# Patient Record
Sex: MALE | Race: WHITE | NOT HISPANIC OR LATINO | Employment: UNEMPLOYED | URBAN - METROPOLITAN AREA
[De-identification: names, ages, dates, MRNs, and addresses within clinical notes are randomized per-mention and may not be internally consistent; named-entity substitution may affect disease eponyms.]

---

## 2020-04-20 ENCOUNTER — HOSPITAL ENCOUNTER (OUTPATIENT)
Facility: MEDICAL CENTER | Age: 35
End: 2020-04-20
Payer: MEDICAID

## 2020-04-30 PROBLEM — I50.23 ACUTE ON CHRONIC SYSTOLIC CONGESTIVE HEART FAILURE (HCC): Status: ACTIVE | Noted: 2020-04-30

## 2020-04-30 PROBLEM — I26.99 PULMONARY EMBOLISM (HCC): Status: ACTIVE | Noted: 2020-04-30

## 2020-04-30 PROBLEM — I42.7 DRUG-INDUCED CARDIOMYOPATHY (HCC): Status: ACTIVE | Noted: 2020-04-30

## 2020-04-30 PROBLEM — F14.11 HISTORY OF COCAINE ABUSE (HCC): Status: ACTIVE | Noted: 2020-04-30

## 2020-05-13 ENCOUNTER — TELEPHONE (OUTPATIENT)
Dept: CARDIOLOGY | Facility: MEDICAL CENTER | Age: 35
End: 2020-05-13

## 2020-05-13 NOTE — TELEPHONE ENCOUNTER
Called patient to see if he has followed with a cardiologist in the past to get records prior to new patient appt with [facility]. Unable to reach patient, left voicemail for call back.

## 2020-05-28 PROBLEM — R60.9 EDEMA: Status: ACTIVE | Noted: 2020-05-28

## 2020-05-28 PROBLEM — Z09 HOSPITAL DISCHARGE FOLLOW-UP: Status: ACTIVE | Noted: 2020-05-28

## 2020-06-27 PROBLEM — I51.3 INTRACARDIAC THROMBUS: Status: ACTIVE | Noted: 2020-06-27

## 2020-06-27 PROBLEM — Z53.29 LEFT AGAINST MEDICAL ADVICE: Status: ACTIVE | Noted: 2020-06-27

## 2020-06-28 PROBLEM — J18.9 PNEUMONIA: Status: ACTIVE | Noted: 2020-06-28

## 2020-08-02 PROBLEM — K81.0 ACUTE CHOLECYSTITIS: Status: ACTIVE | Noted: 2020-08-02

## 2020-10-16 ENCOUNTER — TELEPHONE (OUTPATIENT)
Dept: CARDIOLOGY | Facility: MEDICAL CENTER | Age: 35
End: 2020-10-16

## 2020-10-19 PROBLEM — I50.9 CONGESTIVE HEART FAILURE (CHF) (HCC): Status: ACTIVE | Noted: 2020-10-19

## 2020-11-05 NOTE — PROGRESS NOTES
"Subjective:   Chief Complaint:   Chief Complaint   Patient presents with   • Edema         David Rod is a 35 y.o. male who returns for dilated CMO, BiV failure, NSTEMI, prior PE, possible LV thrombus, anticoagulation, HTN    Today, [facility] 4 CHF.    Has MI in Feb due to drug use, has dilated CMO, EF < 20%, biV failure  NSTEMI was not Type I MI.  Was told \"blood clot in my lung.\" Later coughing up blood.  On warfarin.  No LV thrombus on echo     E[place] 2020  Severely dilated LV <20%, RV moderately dilated, severe LAE, mild to mod TR, mod MR, RVSP 70, RAP 20, left pleural effusion, small dimitry effusion, flat septum.    Having [facility] 4 HFrEF, Stage C. Can only walk about 10 feet.  Weight at home was down to 220, now up to 260.  Has LE edema and belly edema.  Has orthopnea, sleeping in a chair.  Was told IV lasix was important.  Passive liver congestion.    Has atypical CP, happens at rest, a little shock, does not last long.  No significant dyspnea on exertion, orthopnea or lower extremity swelling.   No stroke/TIA like symptoms.    Gets heart racing about once a week when \"I over do it.\"  He gets lightheaded at times but no syncope.    Has fallen due to leg weakness but not syncope.    Has a venous clot removed from arm after some kind of line.  [facility] 10-4-2020 Occlusive thrombus within the right internal jugular vein, nonocclusive thrombus within the right median cubital vein.    Has a LE wound, on Abx. Will see wound care.    Has HTN, getting at home sometimes.    No prior HLP but no recent lipids    Father had MI/stents in his 50s,  at 72.  MGF  of chf in hs 80s.  No prior smoking history.  No history of diabetes.  No history of autoimmune disease such as lupus or rheumatoid arthritis.  No chronic kidney disease.  No ETOH overuse. Prior heavy etoh, sober 6 years.  No caffeine overuse. Soda.  No more recreation substance use.      Here with his mother, his support.    DATA REVIEWED by " me:  ECG (my personal interpretation) [id number]  Sinus, 91, LPFB, delayed R wave progression, T wave inversion    Echo [facility] [place] 9-  Severely dilated LV <20%, RV moderately dilated, severe LAE, mild to mod TR, mod MR, RVSP 70, RAP 20, left pleural effusion, small dimitry effusion, flat septum.    RUQ US 6-3-2020  Cholelithiasis is present.  The pancreas is not well seen.  The liver appears enlarged.  The portal vein is not reliably imaged.    US UE 10-4-2020  Occlusive thrombus within the right internal jugular vein  Nonocclusive thrombus within the right median cubital vein.    Most recent labs:       Lab Results   Component Value Date/Time    WBC 4.8 11/05/2020 03:48 PM    HEMOGLOBIN 13.7 (L) 11/05/2020 03:48 PM    HEMATOCRIT 45.0 11/05/2020 03:48 PM    MCV 93.0 11/05/2020 03:48 PM    INR 1.52 11/05/2020 03:48 PM      Lab Results   Component Value Date/Time    SODIUM 135 (L) 11/05/2020 03:48 PM    POTASSIUM 3.9 11/05/2020 03:48 PM    CHLORIDE 104 11/05/2020 03:48 PM    CO2 20 (L) 11/05/2020 03:48 PM    GLUCOSE 126 (H) 11/05/2020 03:48 PM    BUN 12 11/05/2020 03:48 PM    CREATININE 1.0 11/05/2020 03:48 PM      Lab Results   Component Value Date/Time    ASTSGOT 35 11/05/2020 03:48 PM    ALTSGPT 20 11/05/2020 03:48 PM    ALBUMIN 2.7 (L) 11/05/2020 03:48 PM      No results found for: CHOLSTRLTOT, LDL, HDL, TRIGLYCERIDE  No results for input(s): NTPROBNP, TROPONINT in the last 72 hours.      Past Medical History:   Diagnosis Date   • Cardiomegaly    • Congestive heart failure (HCC)    • Decreased cardiac ejection fraction     15-20%   • Ear pain    • Hypertension    • Insomnia    • Pulmonary embolism and infarction (HCC)    • Systolic heart failure (HCC)      History reviewed. No pertinent surgical history.  Family History   Problem Relation Age of Onset   • Heart Disease Father    • Heart Attack Father         Stents in 50s     Social History     Socioeconomic History   • Marital status: Single     Spouse  name: Not on file   • Number of children: Not on file   • Years of education: Not on file   • Highest education level: Not on file   Occupational History   • Not on file   Social Needs   • Financial resource strain: Not on file   • Food insecurity     Worry: Patient refused     Inability: Patient refused   • Transportation needs     Medical: Patient refused     Non-medical: Patient refused   Tobacco Use   • Smoking status: Never Smoker   • Smokeless tobacco: Never Used   Substance and Sexual Activity   • Alcohol use: Not Currently   • Drug use: Not Currently     Comment: Hx of daily cocaine use   • Sexual activity: Not Currently   Lifestyle   • Physical activity     Days per week: Not on file     Minutes per session: Not on file   • Stress: Not on file   Relationships   • Social connections     Talks on phone: Not on file     Gets together: Not on file     Attends Buddhism service: Not on file     Active member of club or organization: Not on file     Attends meetings of clubs or organizations: Not on file     Relationship status: Not on file   • Intimate partner violence     Fear of current or ex partner: Not on file     Emotionally abused: Not on file     Physically abused: Not on file     Forced sexual activity: Not on file   Other Topics Concern   • Not on file   Social History Narrative   • Not on file     No Known Allergies    Current Outpatient Medications   Medication Sig Dispense Refill   • carvedilol (COREG) 6.25 MG Tab Take 1 Tab by mouth 2 times a day, with meals. 200 Tab 3   • torsemide (DEMADEX) 100 MG Tab Take 1 Tab by mouth every day. 100 Tab 3   • torsemide (DEMADEX) 20 MG Tab Take 2 Tabs by mouth every day. 200 Tab 3   • metOLazone (ZAROXOLYN) 5 MG Tab Take 1 Tab by mouth every day. 100 Tab 3   • doxycycline (VIBRAMYCIN) 100 MG Tab Take 1 Tab by mouth 2 times a day for 7 days. 14 Tab 0   • acetaminophen (TYLENOL) 500 MG Tab Take 1,000 mg by mouth every 6 hours as needed.     • lisinopril  (PRINIVIL) 5 MG Tab Take 0.5 Tabs by mouth every day. 30 Tab 0   • pantoprazole (PROTONIX) 40 MG Tablet Delayed Response Take 1 Tab by mouth every day. 30 Tab 0   • spironolactone (ALDACTONE) 25 MG Tab Take 1 Tab by mouth 2 times a day. 30 Tab 0   • warfarin (COUMADIN) 5 MG Tab Take 10 mg by mouth every day. First dose yesterday     • ondansetron (ZOFRAN ODT) 4 MG TABLET DISPERSIBLE Take 1 Tab by mouth every 6 hours as needed for Nausea. 10 Tab 0   • hydrOXYzine HCl (ATARAX) 25 MG Tab Take 25 mg by mouth 3 times a day as needed for Itching.     • docusate sodium 100 MG Cap Take 100 mg by mouth 2 Times a Day. 60 Cap 0   • aspirin EC (ECOTRIN) 81 MG Tablet Delayed Response Take 81 mg by mouth every day.       No current facility-administered medications for this visit.        ROS  All others systems reviewed and negative.     Objective:     /98 (BP Location: Right arm, Patient Position: Sitting)   Pulse (!) 106   Ht 1.829 m (6')   Wt 118.4 kg (261 lb)   SpO2 94%  Body mass index is 35.4 kg/m².    General: No acute distress. Well nourished.  HEENT: EOM grossly intact, no scleral icterus, no pharyngeal erythema.   Neck:  + JVD to mandible at 90, no bruits, trachea midline  CVS: RRR. Normal S1, loud S2. No M/R/G. 4+ LE edema.  2+ radial pulses  Resp: Mild increase WOB, bibasilar rales  Abdomen: Soft, NT, no karrie hepatomegaly, possible ascites.  MSK/Ext: No clubbing or cyanosis.  Skin: Warm and dry, wound RLE with puss  Neurological: CN III-XII grossly intact. No focal deficits. Generally weak  Psych: A&O x 3, appropriate affect, good judgement        Assessment:     1. Drug-induced cardiomyopathy (HCC)  EC-ECHOCARDIOGRAM COMPLETE W/O CONT   2. Chronic systolic congestive heart failure (HCC)     3. Pulmonary embolism, other, unspecified chronicity, unspecified whether acute cor pulmonale present (HCC)     4. History of cocaine abuse (HCC)     5. Intracardiac thrombus     6. Hx of non-ST elevation myocardial  infarction (NSTEMI)     7. Acute systolic congestive heart failure (HCC)  Basic Metabolic Panel    Basic Metabolic Panel    Basic Metabolic Panel   8. Family history of coronary artery disease in father     9. Other chest pain     10. Palpitations     11. Other secondary pulmonary hypertension (HCC)     12. Tricuspid valve regurgitation, nonrheumatic     13. Non-rheumatic mitral regurgitation     14. Pericardial effusion         Medical Decision Making:  Today's Assessment / Status / Plan:     -Needs serious med changes and aggressive fluid reduction with close monitoring  -needs eventual FU echo, I would say 3 months  -Then consider ICD  -Has been sober  -Atypical CP, not concerned  -Stay on warfarin  -change BB  -May need lasix, will FU BPM 3 days, 1 week, every week thereafter  -Lipids later  -RTC 1-2 weeks, looking for spot      Written instructions given today:    -Stop metoprolol, start carvedilol 6.25 mg AM and PM. I am not worried about BP being too low unless it is under 90 on the stop number OR you feel like you are so lightheaded you are going to pass out.    -Stop Lasix=furosemide, start torsemide 100 mg morning and 40 mg in the afternoon    -Start taking metolazone 5 mg once daily until your weight is down to 240 pounds, then go to every 3rd day    -non fasting blood work in 3 days, then 1 week, then every one week until your weight is down to about 230    -Use itzatt to report concerns about weight loss or new symptoms, watch for lightheadedness    -You should always hear results of testing within 5 days with my interpretation, if you do not, send a Inform Direct message or call the office: [phone].        Return in about 2 weeks (around 11/20/2020).    It is my pleasure to participate in the care of Mr. Rod.  Please do not hesitate to contact me with questions or concerns.    Karli Grace MD, [facility]  Cardiologist [facility]    Please note that this dictation  was created using voice recognition software. I have made every reasonable attempt to correct obvious errors, but it is possible there are errors of grammar and possibly content that I did not discover before finalizing the note.

## 2020-11-06 ENCOUNTER — OFFICE VISIT (OUTPATIENT)
Dept: CARDIOLOGY | Facility: CLINIC | Age: 35
End: 2020-11-06
Payer: MEDICAID

## 2020-11-06 ENCOUNTER — TELEPHONE (OUTPATIENT)
Dept: CARDIOLOGY | Facility: MEDICAL CENTER | Age: 35
End: 2020-11-06

## 2020-11-06 VITALS
SYSTOLIC BLOOD PRESSURE: 116 MMHG | BODY MASS INDEX: 35.35 KG/M2 | DIASTOLIC BLOOD PRESSURE: 98 MMHG | OXYGEN SATURATION: 94 % | WEIGHT: 261 LBS | HEART RATE: 106 BPM | HEIGHT: 72 IN

## 2020-11-06 DIAGNOSIS — I36.1 TRICUSPID VALVE REGURGITATION, NONRHEUMATIC: ICD-10-CM

## 2020-11-06 DIAGNOSIS — I25.2 HX OF NON-ST ELEVATION MYOCARDIAL INFARCTION (NSTEMI): ICD-10-CM

## 2020-11-06 DIAGNOSIS — I27.29 OTHER SECONDARY PULMONARY HYPERTENSION (HCC): ICD-10-CM

## 2020-11-06 DIAGNOSIS — I34.0 NON-RHEUMATIC MITRAL REGURGITATION: ICD-10-CM

## 2020-11-06 DIAGNOSIS — I51.3 INTRACARDIAC THROMBUS: ICD-10-CM

## 2020-11-06 DIAGNOSIS — R00.2 PALPITATIONS: ICD-10-CM

## 2020-11-06 DIAGNOSIS — Z82.49 FAMILY HISTORY OF CORONARY ARTERY DISEASE IN FATHER: ICD-10-CM

## 2020-11-06 DIAGNOSIS — I26.99 PULMONARY EMBOLISM, OTHER, UNSPECIFIED CHRONICITY, UNSPECIFIED WHETHER ACUTE COR PULMONALE PRESENT (HCC): ICD-10-CM

## 2020-11-06 DIAGNOSIS — I50.22 CHRONIC SYSTOLIC CONGESTIVE HEART FAILURE (HCC): ICD-10-CM

## 2020-11-06 DIAGNOSIS — I42.7 DRUG-INDUCED CARDIOMYOPATHY (HCC): ICD-10-CM

## 2020-11-06 DIAGNOSIS — R07.89 OTHER CHEST PAIN: ICD-10-CM

## 2020-11-06 DIAGNOSIS — I50.21 ACUTE SYSTOLIC CONGESTIVE HEART FAILURE (HCC): ICD-10-CM

## 2020-11-06 DIAGNOSIS — F14.11 HISTORY OF COCAINE ABUSE (HCC): ICD-10-CM

## 2020-11-06 DIAGNOSIS — I31.39 PERICARDIAL EFFUSION: ICD-10-CM

## 2020-11-06 PROCEDURE — 99205 OFFICE O/P NEW HI 60 MIN: CPT | Performed by: INTERNAL MEDICINE

## 2020-11-06 RX ORDER — TORSEMIDE 20 MG/1
40 TABLET ORAL DAILY
Qty: 200 TAB | Refills: 3 | Status: SHIPPED | OUTPATIENT
Start: 2020-11-06 | End: 2020-11-20 | Stop reason: SDUPTHER

## 2020-11-06 RX ORDER — TORSEMIDE 100 MG/1
100 TABLET ORAL DAILY
Qty: 100 TAB | Refills: 3 | Status: SHIPPED | OUTPATIENT
Start: 2020-11-06 | End: 2020-11-20

## 2020-11-06 RX ORDER — TORSEMIDE 20 MG/1
40 TABLET ORAL DAILY
Qty: 200 TAB | Refills: 3 | Status: SHIPPED | OUTPATIENT
Start: 2020-11-06 | End: 2020-11-06 | Stop reason: SDUPTHER

## 2020-11-06 RX ORDER — CARVEDILOL 6.25 MG/1
6.25 TABLET ORAL 2 TIMES DAILY WITH MEALS
Qty: 200 TAB | Refills: 3 | Status: SHIPPED | OUTPATIENT
Start: 2020-11-06 | End: 2020-11-20

## 2020-11-06 RX ORDER — METOLAZONE 5 MG/1
5 TABLET ORAL DAILY
Qty: 100 TAB | Refills: 3 | Status: SHIPPED | OUTPATIENT
Start: 2020-11-06 | End: 2020-11-20 | Stop reason: SDUPTHER

## 2020-11-06 ASSESSMENT — FIBROSIS 4 INDEX: FIB4 SCORE: 1.4

## 2020-11-06 NOTE — TELEPHONE ENCOUNTER
Carla Wiseman, Med Ass't  Petra Fox R.N.          Previous Messages    ----- Message -----   From: Carla Borden, Med Ass't   Sent: 11/6/2020  11:22 AM PST   To: Zarina Tenorio   Subject: Rx sent to different pharm                       LS    Pt calling in regards to pharmacy that Rx torsemide (DEMADEX) 20 MG Tab, was sent to [facility] in [place], does NOT have the 20mg tabs available. Pt was wondering if we can send a new Rx of the torsemide 20mg tabs over to the [facility] pharmacy in [place].   Please call to confirm with pt at  [phone].     Thank you.        Rx sent to [facility] per pt request.

## 2020-11-06 NOTE — PATIENT INSTRUCTIONS
-Stop metoprolol, start carvedilol 6.25 mg AM and PM. I am not worried about BP being too low unless it is under 90 on the stop number OR you feel like you are so lightheaded you are going to pass out.    -Stop Lasix=furosemide, start torsemide 100 mg morning and 40 mg in the afternoon    -Start taking metolazone 5 mg once daily until your weight is down to 240 pounds, then go to every 3rd day    -non fasting blood work in 3 days, then 1 week, then every one week until your weight is down to about 230    -Use Cobrain to report concerns about weight loss or new symptoms, watch for lightheadedness    -You should always hear results of testing within 5 days with my interpretation, if you do not, send a Cobrain message or call the office: [phone].

## 2020-11-15 ENCOUNTER — PATIENT MESSAGE (OUTPATIENT)
Dept: CARDIOLOGY | Facility: CLINIC | Age: 35
End: 2020-11-15

## 2020-11-18 ENCOUNTER — TELEPHONE (OUTPATIENT)
Dept: VASCULAR LAB | Facility: MEDICAL CENTER | Age: 35
End: 2020-11-18

## 2020-11-18 DIAGNOSIS — Z79.01 ANTICOAGULATED: ICD-10-CM

## 2020-11-18 DIAGNOSIS — I51.3 INTRACARDIAC THROMBUS: ICD-10-CM

## 2020-11-18 PROBLEM — L98.9 SKIN LESION: Status: ACTIVE | Noted: 2020-11-18

## 2020-11-18 PROBLEM — I25.2 H/O NON-ST ELEVATION MYOCARDIAL INFARCTION (NSTEMI): Status: ACTIVE | Noted: 2020-11-18

## 2020-11-18 PROBLEM — R11.0 NAUSEA: Status: ACTIVE | Noted: 2020-11-18

## 2020-11-18 NOTE — PROGRESS NOTES
"Subjective:   Chief Complaint:   Chief Complaint   Patient presents with   • Pulmonary Embolism     F/V DX:EDEMA   • MI (Non ST Segment Elevation MI)   • Congestive Heart Failure       David Rod is a 35 y.o. male who returns for dilated CMO, BiV failure, NSTEMI, prior PE, possible LV thrombus, anticoagulation, HTN    My first visit with him early 2020, he was in [facility] 4 CHF.  We changed meds, he lost 40-45 pounds in 2 weeks.  Weight was as high as 260, was previously 220 leaving the hospital, now 215.  LE edema much better.  Was in a W/C, no longer  Now [facility] 2-3, can go around the block.    He starting to ride stationary bike now.    Has MI in Feb due to drug use, has dilated CMO, EF < 20%, biV failure  NSTEMI was not Type I MI.  Was told \"blood clot in my lung.\" Later coughing up blood.  On warfarin.  No LV thrombus on echo     [place] [place] 2020  Severely dilated LV <20%, RV moderately dilated, severe LAE, mild to mod TR, mod MR, RVSP 70, RAP 20, left pleural effusion, small dimitry effusion, flat septum.    Passive liver congestion.    Has atypical CP, happens at rest, a little shock, does not last long.  No stroke/TIA like symptoms.    Gets heart racing about once a week when \"I over do it.\" Not as bad after water weight is down.  He gets lightheaded at times but no syncope.    Has fallen due to leg weakness but not syncope.    Has a venous clot removed from arm after some kind of line.  [facility] thrombus within the right internal jugular vein, nonocclusive thrombus within the right median cubital vein.    Has a LE wound, seeing wound clinic.    Has HTN, getting at home sometimes.    No prior HLP but no recent lipids.    Father had MI/stents in his 50s,  at 72.  MGF  of chf in hs 80s.  No prior smoking history.  No history of diabetes.  No history of autoimmune disease such as lupus or rheumatoid arthritis.  No chronic kidney disease.  No ETOH overuse. Prior heavy " etoh, sober 6 years.  No caffeine overuse. Soda.  No more recreation substance use.      Here with his mother, his support.    DATA REVIEWED by me:  ECG (my personal interpretation) [id number]  Sinus, 91, LPFB, delayed R wave progression, T wave inversion    Echo [facility] [place] 9-  Severely dilated LV <20%, RV moderately dilated, severe LAE, mild to mod TR, mod MR, RVSP 70, RAP 20, left pleural effusion, small dimitry effusion, flat septum.    [facility] US 6-3-2020  Cholelithiasis is present.  The pancreas is not well seen.  The liver appears enlarged.  The portal vein is not reliably imaged.    US UE 10-4-2020  Occlusive thrombus within the right internal jugular vein  Nonocclusive thrombus within the right median cubital vein.    Most recent labs:       Lab Results   Component Value Date/Time    WBC 4.8 11/05/2020 03:48 PM    HEMOGLOBIN 13.7 (L) 11/05/2020 03:48 PM    HEMATOCRIT 45.0 11/05/2020 03:48 PM    MCV 93.0 11/05/2020 03:48 PM    INR 1.52 11/05/2020 03:48 PM      Lab Results   Component Value Date/Time    SODIUM 130 (L) 11/20/2020 10:35 AM    POTASSIUM 3.4 (L) 11/20/2020 10:35 AM    CHLORIDE 93 (L) 11/20/2020 10:35 AM    CO2 26 11/20/2020 10:35 AM    GLUCOSE 129 (H) 11/20/2020 10:35 AM    BUN 41 (H) 11/20/2020 10:35 AM    CREATININE 1.0 11/20/2020 10:35 AM      Lab Results   Component Value Date/Time    ASTSGOT 35 11/05/2020 03:48 PM    ALTSGPT 20 11/05/2020 03:48 PM    ALBUMIN 2.7 (L) 11/05/2020 03:48 PM      No results found for: CHOLSTRLTOT, LDL, HDL, TRIGLYCERIDE  No results for input(s): NTPROBNP, TROPONINT in the last 72 hours.      Past Medical History:   Diagnosis Date   • Cardiomegaly    • Congestive heart failure (HCC)    • Decreased cardiac ejection fraction     15-20%   • Ear pain    • Hypertension    • Insomnia    • Pulmonary embolism and infarction (HCC)    • Systolic heart failure (HCC)      History reviewed. No pertinent surgical history.  Family History   Problem Relation Age of Onset   •  Heart Disease Father    • Heart Attack Father         Stents in 50s     Social History     Socioeconomic History   • Marital status: Single     Spouse name: Not on file   • Number of children: Not on file   • Years of education: Not on file   • Highest education level: Not on file   Occupational History   • Not on file   Social Needs   • Financial resource strain: Not on file   • Food insecurity     Worry: Patient refused     Inability: Patient refused   • Transportation needs     Medical: Patient refused     Non-medical: Patient refused   Tobacco Use   • Smoking status: Never Smoker   • Smokeless tobacco: Never Used   Substance and Sexual Activity   • Alcohol use: Not Currently   • Drug use: Not Currently     Comment: Hx of daily cocaine use   • Sexual activity: Not Currently   Lifestyle   • Physical activity     Days per week: Not on file     Minutes per session: Not on file   • Stress: Not on file   Relationships   • Social connections     Talks on phone: Not on file     Gets together: Not on file     Attends Oriental orthodox service: Not on file     Active member of club or organization: Not on file     Attends meetings of clubs or organizations: Not on file     Relationship status: Not on file   • Intimate partner violence     Fear of current or ex partner: Not on file     Emotionally abused: Not on file     Physically abused: Not on file     Forced sexual activity: Not on file   Other Topics Concern   • Not on file   Social History Narrative   • Not on file     No Known Allergies    Current Outpatient Medications   Medication Sig Dispense Refill   • torsemide (DEMADEX) 20 MG Tab Take 2 Tabs by mouth every day. 200 Tab 3   • spironolactone (ALDACTONE) 25 MG Tab Take 1 Tab by mouth every day. 100 Tab 3   • carvedilol (COREG) 12.5 MG Tab Take 1 Tab by mouth 2 times a day, with meals. 200 Tab 3   • lisinopril (PRINIVIL) 2.5 MG Tab Take 1 Tab by mouth every day. 100 Tab 3   • metOLazone (ZAROXOLYN) 5 MG Tab Take 1 Tab by  mouth 1 time daily as needed.     • warfarin (COUMADIN) 5 MG Tab Take 10 mg by mouth every day.     • ondansetron (ZOFRAN ODT) 4 MG TABLET DISPERSIBLE Take 1 Tab by mouth every 6 hours as needed for Nausea. 20 Tab 5   • acetaminophen (TYLENOL) 500 MG Tab Take 1,000 mg by mouth every 6 hours as needed.     • pantoprazole (PROTONIX) 40 MG Tablet Delayed Response Take 1 Tab by mouth every day. 30 Tab 0   • docusate sodium 100 MG Cap Take 100 mg by mouth 2 Times a Day. 60 Cap 0     No current facility-administered medications for this visit.        ROS    All others systems reviewed and negative.     Objective:     /72 (BP Location: Left arm, Patient Position: Sitting, BP Cuff Size: Adult)   Pulse 98   Ht 1.829 m (6')   Wt 95.7 kg (211 lb)   SpO2 93%  Body mass index is 28.62 kg/m².    General: No acute distress. Well nourished.  HEENT: EOM grossly intact, no scleral icterus, no pharyngeal erythema.   Neck:  No JVD, no bruits, trachea midline  CVS: RRR. Normal S1, S2. No M/R/G. No LE edema.  2+ radial pulses, 2+ PT pulses  Resp: CTAB. No wheezing or crackles/rhonchi. Normal respiratory effort.  Abdomen: Soft, NT, no karrie hepatomegaly.  MSK/Ext: No clubbing or cyanosis.  Skin: Warm and dry, bandage RLE  Neurological: CN III-XII grossly intact. No focal deficits.   Psych: A&O x 3, appropriate affect, good judgement      Assessment:     1. Drug-induced cardiomyopathy (HCC)  EC-ECHOCARDIOGRAM COMPLETE W/O CONT   2. Acute systolic congestive heart failure (HCC)  Comp Metabolic Panel   3. Family history of coronary artery disease in father     4. Other chest pain     5. Pulmonary embolism, other, unspecified chronicity, unspecified whether acute cor pulmonale present (HCC)     6. Palpitations     7. Intracardiac thrombus     8. Other secondary pulmonary hypertension (HCC)     9. Hx of non-ST elevation myocardial infarction (NSTEMI)  Lipid Profile   10. Tricuspid valve regurgitation, nonrheumatic     11.  Non-rheumatic mitral regurgitation     12. Pericardial effusion         Medical Decision Making:  Today's Assessment / Status / Plan:     -Dry weight appears to be about 215, see below  -needs eventual FU echo, I would say 3 months, ordered today for Feb 2020  -Then consider ICD  -Has been sober, good job  -Atypical CP, not concerned  -Rare palpitations, better.  -Stay on warfarin  -adjust CHF meds, see below  -Lipids and CMP  -He starting to ride stationary bike now  -RTC 3-4 weeks      Written instructions given today:      -Cut your lisinopril from 5 to 2.5 mg daily    -Increase your carvedilol from 6.25 mg AM and PM to 12.5 mg AM and PM (take 2 pills twice a day until gone)    -Reduce spironolactone from 25 mg AM and PM to just once daily    -Reduce your torsemide from 100 mg every other daily to 40 mg once daily, hand on to your old 100 mg, don't throw out    -Do not use zaroxlyn unless we instruct you to do so, hold onto it.    -If your weight goes over 218 pounds, increase your torsemide from 40 mg to 60 mg until it is back down to 215.      -You might determine that you need torsemide 60 mg every day to maintain weight at 215 pounds, if that is the case, let us know.      Return in about 4 weeks (around 12/18/2020).    It is my pleasure to participate in the care of Mr. Rod.  Please do not hesitate to contact me with questions or concerns.    Karli Grace MD, [facility]  Cardiologist [facility]    Please note that this dictation was created using voice recognition software. I have made every reasonable attempt to correct obvious errors, but it is possible there are errors of grammar and possibly content that I did not discover before finalizing the note.

## 2020-11-18 NOTE — TELEPHONE ENCOUNTER
Left voicemail message for patient to return call to [facility] to establish care      cristiana Adams, Clinical Pharmacist, CDE, CACP

## 2020-11-19 ENCOUNTER — TELEPHONE (OUTPATIENT)
Dept: VASCULAR LAB | Facility: MEDICAL CENTER | Age: 35
End: 2020-11-19

## 2020-11-19 NOTE — TELEPHONE ENCOUNTER
Left VM for pt regarding referral to anticoagulation from Dr. Grace for warfarin. Asked pt to please call back to establish care.     Insurance: medicaid  PCP: non- [facility]   Location: [address]       Dena A Gurries, PharmD

## 2020-11-20 ENCOUNTER — OFFICE VISIT (OUTPATIENT)
Dept: CARDIOLOGY | Facility: CLINIC | Age: 35
End: 2020-11-20
Payer: MEDICAID

## 2020-11-20 VITALS
OXYGEN SATURATION: 93 % | SYSTOLIC BLOOD PRESSURE: 114 MMHG | WEIGHT: 211 LBS | HEIGHT: 72 IN | BODY MASS INDEX: 28.58 KG/M2 | DIASTOLIC BLOOD PRESSURE: 72 MMHG | HEART RATE: 98 BPM

## 2020-11-20 DIAGNOSIS — I26.99 PULMONARY EMBOLISM, OTHER, UNSPECIFIED CHRONICITY, UNSPECIFIED WHETHER ACUTE COR PULMONALE PRESENT (HCC): ICD-10-CM

## 2020-11-20 DIAGNOSIS — I51.3 INTRACARDIAC THROMBUS: ICD-10-CM

## 2020-11-20 DIAGNOSIS — R00.2 PALPITATIONS: ICD-10-CM

## 2020-11-20 DIAGNOSIS — I34.0 NON-RHEUMATIC MITRAL REGURGITATION: ICD-10-CM

## 2020-11-20 DIAGNOSIS — I25.2 HX OF NON-ST ELEVATION MYOCARDIAL INFARCTION (NSTEMI): ICD-10-CM

## 2020-11-20 DIAGNOSIS — Z82.49 FAMILY HISTORY OF CORONARY ARTERY DISEASE IN FATHER: ICD-10-CM

## 2020-11-20 DIAGNOSIS — I31.39 PERICARDIAL EFFUSION: ICD-10-CM

## 2020-11-20 DIAGNOSIS — I50.21 ACUTE SYSTOLIC CONGESTIVE HEART FAILURE (HCC): ICD-10-CM

## 2020-11-20 DIAGNOSIS — I42.7 DRUG-INDUCED CARDIOMYOPATHY (HCC): ICD-10-CM

## 2020-11-20 DIAGNOSIS — R07.89 OTHER CHEST PAIN: ICD-10-CM

## 2020-11-20 DIAGNOSIS — I36.1 TRICUSPID VALVE REGURGITATION, NONRHEUMATIC: ICD-10-CM

## 2020-11-20 DIAGNOSIS — I27.29 OTHER SECONDARY PULMONARY HYPERTENSION (HCC): ICD-10-CM

## 2020-11-20 PROCEDURE — 99214 OFFICE O/P EST MOD 30 MIN: CPT | Performed by: INTERNAL MEDICINE

## 2020-11-20 RX ORDER — TORSEMIDE 20 MG/1
40 TABLET ORAL DAILY
Qty: 200 TAB | Refills: 3 | Status: SHIPPED | OUTPATIENT
Start: 2020-11-20 | End: 2021-11-01

## 2020-11-20 RX ORDER — CARVEDILOL 12.5 MG/1
12.5 TABLET ORAL 2 TIMES DAILY WITH MEALS
Qty: 200 TAB | Refills: 3 | Status: SHIPPED | OUTPATIENT
Start: 2020-11-20 | End: 2021-09-22

## 2020-11-20 RX ORDER — METOLAZONE 5 MG/1
5 TABLET ORAL
COMMUNITY
Start: 2020-11-20 | End: 2021-09-22

## 2020-11-20 RX ORDER — FUROSEMIDE 10 MG/ML
INJECTION INTRAMUSCULAR; INTRAVENOUS
COMMUNITY
Start: 2020-09-12 | End: 2020-11-20

## 2020-11-20 RX ORDER — SPIRONOLACTONE 25 MG/1
25 TABLET ORAL DAILY
Qty: 100 TAB | Refills: 3 | Status: SHIPPED | OUTPATIENT
Start: 2020-11-20 | End: 2021-09-22

## 2020-11-20 RX ORDER — LISINOPRIL 2.5 MG/1
2.5 TABLET ORAL DAILY
Qty: 100 TAB | Refills: 3 | Status: SHIPPED | OUTPATIENT
Start: 2020-11-20 | End: 2021-09-22

## 2020-11-20 ASSESSMENT — FIBROSIS 4 INDEX: FIB4 SCORE: 1.4

## 2020-11-20 NOTE — LETTER
"     [facility] [address],   [address]  [place] [address]  Phone: [phone]  Fax: [phone]              David Rod  1985    Encounter Date: 11/20/2020    Karli Grace M.D.          PROGRESS NOTE:  Subjective:   Chief Complaint:   Chief Complaint   Patient presents with   • Pulmonary Embolism     F/V DX:EDEMA   • MI (Non ST Segment Elevation MI)   • Congestive Heart Failure       David Rod is a 35 y.o. male who returns for dilated CMO, BiV failure, NSTEMI, prior PE, possible LV thrombus, anticoagulation, HTN    My first visit with him early Nov 2020, he was in [facility] 4 CHF.  We changed meds, he lost 40-45 pounds in 2 weeks.  Weight was as high as 260, was previously 220 leaving the hospital, now 215.  LE edema much better.  Was in a W/C, no longer  Now [facility] 2-3, can go around the block.    He starting to ride stationary bike now.    Has MI in Feb due to drug use, has dilated CMO, EF < 20%, biV failure  NSTEMI was not Type I MI.  Was told \"blood clot in my lung.\" Later coughing up blood.  On warfarin.  No LV thrombus on echo 9-2020    Echo [facility] 9-  Severely dilated LV <20%, RV moderately dilated, severe LAE, mild to mod TR, mod MR, RVSP 70, RAP 20, left pleural effusion, small dimitry effusion, flat septum.    Passive liver congestion.    Has atypical CP, happens at rest, a little shock, does not last long.  No stroke/TIA like symptoms.    Gets heart racing about once a week when \"I over do it.\" Not as bad after water weight is down.  He gets lightheaded at times but no syncope.    Has fallen due to leg weakness but not syncope.    Has a venous clot removed from arm after some kind of line.  [facility] 10-4-2020 Occlusive thrombus within the right internal jugular vein, nonocclusive thrombus within the right median cubital vein.    Has a LE wound, seeing wound clinic.    Has HTN, getting at home sometimes.    No " prior HLP but no recent lipids.    Father had MI/stents in his 50s,  at 72.  MGF  of chf in hs 80s.  No prior smoking history.  No history of diabetes.  No history of autoimmune disease such as lupus or rheumatoid arthritis.  No chronic kidney disease.  No ETOH overuse. Prior heavy etoh, sober 6 years.  No caffeine overuse. Soda.  No more recreation substance use.      Here with his mother, his support.    DATA REVIEWED by me:  ECG (my personal interpretation) [id number]  Sinus, 91, LPFB, delayed R wave progression, T wave inversion    Echo [facility] 2020  Severely dilated LV <20%, RV moderately dilated, severe LAE, mild to mod TR, mod MR, RVSP 70, RAP 20, left pleural effusion, small dimitry effusion, flat septum.    RUQ US 6-3-2020  Cholelithiasis is present.  The pancreas is not well seen.  The liver appears enlarged.  The portal vein is not reliably imaged.    US UE 10-4-2020  Occlusive thrombus within the right internal jugular vein  Nonocclusive thrombus within the right median cubital vein.    Most recent labs:       Lab Results   Component Value Date/Time    WBC 4.8 2020 03:48 PM    HEMOGLOBIN 13.7 (L) 2020 03:48 PM    HEMATOCRIT 45.0 2020 03:48 PM    MCV 93.0 2020 03:48 PM    INR 1.52 2020 03:48 PM      Lab Results   Component Value Date/Time    SODIUM 130 (L) 2020 10:35 AM    POTASSIUM 3.4 (L) 2020 10:35 AM    CHLORIDE 93 (L) 2020 10:35 AM    CO2 26 2020 10:35 AM    GLUCOSE 129 (H) 2020 10:35 AM    BUN 41 (H) 2020 10:35 AM    CREATININE 1.0 2020 10:35 AM      Lab Results   Component Value Date/Time    ASTSGOT 35 2020 03:48 PM    ALTSGPT 20 2020 03:48 PM    ALBUMIN 2.7 (L) 2020 03:48 PM      No results found for: CHOLSTRLTOT, LDL, HDL, TRIGLYCERIDE  No results for input(s): NTPROBNP, TROPONINT in the last 72 hours.      Past Medical History:   Diagnosis Date   • Cardiomegaly    • Congestive heart failure  (HCC)    • Decreased cardiac ejection fraction     15-20%   • Ear pain    • Hypertension    • Insomnia    • Pulmonary embolism and infarction (HCC)    • Systolic heart failure (HCC)      History reviewed. No pertinent surgical history.  Family History   Problem Relation Age of Onset   • Heart Disease Father    • Heart Attack Father         Stents in 50s     Social History     Socioeconomic History   • Marital status: Single     Spouse name: Not on file   • Number of children: Not on file   • Years of education: Not on file   • Highest education level: Not on file   Occupational History   • Not on file   Social Needs   • Financial resource strain: Not on file   • Food insecurity     Worry: Patient refused     Inability: Patient refused   • Transportation needs     Medical: Patient refused     Non-medical: Patient refused   Tobacco Use   • Smoking status: Never Smoker   • Smokeless tobacco: Never Used   Substance and Sexual Activity   • Alcohol use: Not Currently   • Drug use: Not Currently     Comment: Hx of daily cocaine use   • Sexual activity: Not Currently   Lifestyle   • Physical activity     Days per week: Not on file     Minutes per session: Not on file   • Stress: Not on file   Relationships   • Social connections     Talks on phone: Not on file     Gets together: Not on file     Attends Jain service: Not on file     Active member of club or organization: Not on file     Attends meetings of clubs or organizations: Not on file     Relationship status: Not on file   • Intimate partner violence     Fear of current or ex partner: Not on file     Emotionally abused: Not on file     Physically abused: Not on file     Forced sexual activity: Not on file   Other Topics Concern   • Not on file   Social History Narrative   • Not on file     No Known Allergies    Current Outpatient Medications   Medication Sig Dispense Refill   • torsemide (DEMADEX) 20 MG Tab Take 2 Tabs by mouth every day. 200 Tab 3   •  spironolactone (ALDACTONE) 25 MG Tab Take 1 Tab by mouth every day. 100 Tab 3   • carvedilol (COREG) 12.5 MG Tab Take 1 Tab by mouth 2 times a day, with meals. 200 Tab 3   • lisinopril (PRINIVIL) 2.5 MG Tab Take 1 Tab by mouth every day. 100 Tab 3   • metOLazone (ZAROXOLYN) 5 MG Tab Take 1 Tab by mouth 1 time daily as needed.     • warfarin (COUMADIN) 5 MG Tab Take 10 mg by mouth every day.     • ondansetron (ZOFRAN ODT) 4 MG TABLET DISPERSIBLE Take 1 Tab by mouth every 6 hours as needed for Nausea. 20 Tab 5   • acetaminophen (TYLENOL) 500 MG Tab Take 1,000 mg by mouth every 6 hours as needed.     • pantoprazole (PROTONIX) 40 MG Tablet Delayed Response Take 1 Tab by mouth every day. 30 Tab 0   • docusate sodium 100 MG Cap Take 100 mg by mouth 2 Times a Day. 60 Cap 0     No current facility-administered medications for this visit.        ROS    All others systems reviewed and negative.     Objective:     /72 (BP Location: Left arm, Patient Position: Sitting, BP Cuff Size: Adult)   Pulse 98   Ht 1.829 m (6')   Wt 95.7 kg (211 lb)   SpO2 93%  Body mass index is 28.62 kg/m².    General: No acute distress. Well nourished.  HEENT: EOM grossly intact, no scleral icterus, no pharyngeal erythema.   Neck:  No JVD, no bruits, trachea midline  CVS: RRR. Normal S1, S2. No M/R/G. No LE edema.  2+ radial pulses, 2+ PT pulses  Resp: CTAB. No wheezing or crackles/rhonchi. Normal respiratory effort.  Abdomen: Soft, NT, no karrie hepatomegaly.  MSK/Ext: No clubbing or cyanosis.  Skin: Warm and dry, bandage RLE  Neurological: CN III-XII grossly intact. No focal deficits.   Psych: A&O x 3, appropriate affect, good judgement      Assessment:     1. Drug-induced cardiomyopathy (HCC)  EC-ECHOCARDIOGRAM COMPLETE W/O CONT   2. Acute systolic congestive heart failure (HCC)  Comp Metabolic Panel   3. Family history of coronary artery disease in father     4. Other chest pain     5. Pulmonary embolism, other, unspecified chronicity,  unspecified whether acute cor pulmonale present (HCC)     6. Palpitations     7. Intracardiac thrombus     8. Other secondary pulmonary hypertension (HCC)     9. Hx of non-ST elevation myocardial infarction (NSTEMI)  Lipid Profile   10. Tricuspid valve regurgitation, nonrheumatic     11. Non-rheumatic mitral regurgitation     12. Pericardial effusion         Medical Decision Making:  Today's Assessment / Status / Plan:     -Dry weight appears to be about 215, see below  -needs eventual FU echo, I would say 3 months, ordered today for Feb 2020  -Then consider ICD  -Has been sober, good job  -Atypical CP, not concerned  -Rare palpitations, better.  -Stay on warfarin  -adjust CHF meds, see below  -Lipids and CMP  -He starting to ride stationary bike now  -RTC 3-4 weeks      Written instructions given today:      -Cut your lisinopril from 5 to 2.5 mg daily    -Increase your carvedilol from 6.25 mg AM and PM to 12.5 mg AM and PM (take 2 pills twice a day until gone)    -Reduce spironolactone from 25 mg AM and PM to just once daily    -Reduce your torsemide from 100 mg every other daily to 40 mg once daily, hand on to your old 100 mg, don't throw out    -Do not use zaroxlyn unless we instruct you to do so, hold onto it.    -If your weight goes over 218 pounds, increase your torsemide from 40 mg to 60 mg until it is back down to 215.      -You might determine that you need torsemide 60 mg every day to maintain weight at 215 pounds, if that is the case, let us know.      Return in about 4 weeks (around 12/18/2020).    It is my pleasure to participate in the care of Mr. Rod.  Please do not hesitate to contact me with questions or concerns.    Karli Grace MD, Located within [facility]  Cardiologist [facility]    Please note that this dictation was created using voice recognition software. I have made every reasonable attempt to correct obvious errors, but it is possible there are errors of grammar  and possibly content that I did not discover before finalizing the note.        LEROY Davidson.  [address]  [facility] [address]  Via In Basket

## 2020-11-20 NOTE — PATIENT INSTRUCTIONS
-Cut your lisinopril from 5 to 2.5 mg daily    -Increase your carvedilol from 6.25 mg AM and PM to 12.5 mg AM and PM (take 2 pills twice a day until gone)    -Reduce spironolactone from 25 mg AM and PM to just once daily    -Reduce your torsemide from 100 mg every other daily to 40 mg once daily, hand on to your old 100 mg, don't throw out    -Do not use zaroxlyn unless we instruct you to do so, hold onto it.    -If your weight goes over 218 pounds, increase your torsemide from 40 mg to 60 mg until it is back down to 215.      -You might determine that you need torsemide 60 mg every day to maintain weight at 215 pounds, if that is the case, let us know.

## 2020-11-24 LAB — INR PPP: 1.3 (ref 2–3.5)

## 2020-12-02 ENCOUNTER — APPOINTMENT (OUTPATIENT)
Dept: VASCULAR LAB | Facility: MEDICAL CENTER | Age: 35
End: 2020-12-02
Payer: MEDICAID

## 2020-12-08 ENCOUNTER — ANTICOAGULATION VISIT (OUTPATIENT)
Dept: VASCULAR LAB | Facility: MEDICAL CENTER | Age: 35
End: 2020-12-08
Attending: INTERNAL MEDICINE
Payer: MEDICAID

## 2020-12-08 ENCOUNTER — TELEPHONE (OUTPATIENT)
Dept: VASCULAR LAB | Facility: MEDICAL CENTER | Age: 35
End: 2020-12-08

## 2020-12-08 DIAGNOSIS — I51.3 INTRACARDIAC THROMBUS: ICD-10-CM

## 2020-12-08 LAB — INR PPP: 1.7 (ref 2–3.5)

## 2020-12-08 PROCEDURE — 85610 PROTHROMBIN TIME: CPT

## 2020-12-08 PROCEDURE — 99213 OFFICE O/P EST LOW 20 MIN: CPT

## 2020-12-08 NOTE — PROGRESS NOTES
Anticoagulation Summary  As of 2020    INR goal:  2.0-3.0   TTR:  --   INR used for dosin.30 (2020)   Warfarin maintenance plan:  12.5 mg (5 mg x 2.5) every Tue, Thu; 10 mg (5 mg x 2) all other days   Weekly warfarin total:  75 mg   Plan last modified:  Diego Grace, PharmD (2020)   Next INR check:  2020   Target end date:  Indefinite    Indications    Intracardiac thrombus [I51.3]             Anticoagulation Episode Summary     INR check location:  Anticoagulation Clinic    Preferred lab:      Send INR reminders to:      Comments:        Anticoagulation Care Providers     Provider Role Specialty Phone number    [facility]   [phone]    BENJAMIN Davidson  [facility] [phone]        Anticoagulation Patient Findings        HPI:  David Rod was referred to the [facility] for anticoagulation management with warfarin.  He is new to us but not new to warfarin.  He had a intracardiac thrombus and was started on Eliquis however, the cost was prohibitive so he was transitioned to warfarin.  He has been on an anticoagulant for approximately 8 months.  He has moved between various providers.  He currently has [facility], Dr. Grace.  He reports his INRs have been fairly stable except when he forgets a dose or two.    3 vitals included with today's appt-unless patient declined:  (BP, HR, weight, ht, RR)   There were no vitals filed for this visit.    HAS-BLED:  Hypertension-Uncontrolled, >160 mmHg systolic- N  Renal disease Dialysis, transplant, Cr >2.26 mg/dL or >200 µmol/L - N  Liver disease Cirrhosis or bilirubin >2x normal with AST/ALT/AP >3x normal- N  Stroke history, prior major bleeding, or predisposition to bleeding- N  Labile INR, Unstable/high INRs, time in therapeutic range <60%- N  Age >65- N  Medication usage predisposing to bleeding Aspirin, clopidogrel, NSAIDs - N  Alcohol use  ?8 drinks/week- N      Current  Outpatient Medications:   •  torsemide, 40 mg, Oral, DAILY  •  spironolactone, 25 mg, Oral, DAILY  •  carvedilol, 12.5 mg, Oral, BID WITH MEALS  •  lisinopril, 2.5 mg, Oral, DAILY  •  metOLazone, 5 mg, Oral, QDAY PRN  •  warfarin, 10 mg, Oral, DAILY  •  ondansetron, 4 mg, Oral, Q6HRS PRN  •  acetaminophen, 1,000 mg, Oral, Q6HRS PRN  •  pantoprazole, 40 mg, Oral, DAILY  •  docusate sodium, 100 mg, Oral, BID    Pt gave verbal consent  to leave a VM with detailed medical information regarding INR and dose of warfarin.    Pt education done (s/s of bleeding, when to f/u with clinic vs ER, foods with vitamin K, etc)     Assessment:  INR  SUB-therapeutic.      Candidate for DOAC therapy?-No, the cost is not covered under his Medicaid    Pt tolerating medication well, no s/s of bleeding.     Med rec done with pt    Are there any clinically significant drug interactions?-- NO    Plan   Increase weekly warfarin dose as noted.    Follow up:  Follow up appointment in 1 week, per patient as he lives in [place].  I will tentatively schedule him at our [place] clinic however I need to verify that he can use the clinic because he does not have a [facility] provider, but he does have a [facility] cardiologist    I also gave him a standing order in case he needs to get a INR via the lab.      Diego Grace, TerryD

## 2020-12-09 NOTE — TELEPHONE ENCOUNTER
Initial anticoag note and most recent cards note reviewed.  Patient with significant cardiomyopathy. Sounds like he may have had intracardiac thrombus in past   Documented IJ thrombus and PE in past as well (fall 2020)  Pending further recs from cardiology, we will continue with indefinite anticoag with warfarin  We will defer all further cv care, aside from day to day management of anticoagulation to cardiology.     Michael Bloch, MD  Anticoagulation Clinic    Cc:  MARIA DE JESUS Becerril

## 2020-12-17 ENCOUNTER — TELEPHONE (OUTPATIENT)
Dept: CARDIOLOGY | Facility: MEDICAL CENTER | Age: 35
End: 2020-12-17

## 2020-12-17 ENCOUNTER — ANTICOAGULATION MONITORING (OUTPATIENT)
Dept: VASCULAR LAB | Facility: MEDICAL CENTER | Age: 35
End: 2020-12-17

## 2020-12-17 ENCOUNTER — PATIENT MESSAGE (OUTPATIENT)
Dept: CARDIOLOGY | Facility: MEDICAL CENTER | Age: 35
End: 2020-12-17

## 2020-12-17 DIAGNOSIS — I51.3 INTRACARDIAC THROMBUS: ICD-10-CM

## 2020-12-17 NOTE — TELEPHONE ENCOUNTER
Karli Grace M.D.  NIRAV Bradley,   Please send my chart message and ensure that David was fasting when he had his blood work done.  His cholesterol is very abnormal. If he was fasting and I will make a note of it.  We can address the cholesterol later. Thank you.   LS      [employer] message sent.

## 2020-12-18 NOTE — PROGRESS NOTES
Anticoagulation Summary  As of 2020    INR goal:  2.0-3.0   TTR:  --   INR used for dosin.76 (2020)   Warfarin maintenance plan:  12.5 mg (5 mg x 2.5) every Tue, Thu; 10 mg (5 mg x 2) all other days   Weekly warfarin total:  75 mg   Plan last modified:  Diego Grace, PharmD (2020)   Next INR check:  2020   Target end date:  Indefinite    Indications    Intracardiac thrombus [I51.3]             Anticoagulation Episode Summary     INR check location:  Anticoagulation Clinic    Preferred lab:      Send INR reminders to:      Comments:        Anticoagulation Care Providers     Provider Role Specialty Phone number    [facility]   [phone]    BENJAMIN Davidson  [facility] [phone]        Anticoagulation Patient Findings    Left voicemail message to report a therapeutic INR of 2.76.      Pt to continue with current warfarin dosing regimen. Requested pt contact the clinic for any s/s of unusual bleeding, bruising, clotting or any changes to diet or medication.    FU INR in 1 week(s).    Guerda Shetty, PharmD

## 2021-01-04 ENCOUNTER — APPOINTMENT (OUTPATIENT)
Dept: CARDIOLOGY | Facility: CLINIC | Age: 36
End: 2021-01-04
Payer: MEDICAID

## 2021-01-04 NOTE — PROGRESS NOTES
"Subjective:   Chief Complaint:   No chief complaint on file.      David Rod is a 35 y.o. male who returns for dilated CMO, BiV failure, NSTEMI, prior PE, possible LV thrombus, anticoagulation, HTN    My first visit with him early 2020, he was in [facility] 4 CHF.  We changed meds, he lost 40-45 pounds in 2 weeks.  Weight was as high as 260, was previously 220 leaving the hospital, now 215.  LE edema much better.  Was in a W/C, no longer  Now [facility] 2-3, can go around the block.    He starting to ride stationary bike now.    Has MI in Feb due to drug use, has dilated CMO, EF < 20%, biV failure  NSTEMI was not Type I MI.  Was told \"blood clot in my lung.\" Later coughing up blood.  On warfarin.  No LV thrombus on echo     Echo [facility] [place] 2020  Severely dilated LV <20%, RV moderately dilated, severe LAE, mild to mod TR, mod MR, RVSP 70, RAP 20, left pleural effusion, small dimitry effusion, flat septum.    Passive liver congestion.    Has atypical CP, happens at rest, a little shock, does not last long.  No stroke/TIA like symptoms.    Gets heart racing about once a week when \"I over do it.\" Not as bad after water weight is down.  He gets lightheaded at times but no syncope.    Has fallen due to leg weakness but not syncope.    Has a venous clot removed from arm after some kind of line.  [facility] [place] Occlusive thrombus within the right internal jugular vein, nonocclusive thrombus within the right median cubital vein.    Has a LE wound, seeing wound clinic.    Has HTN, getting at home sometimes.    No prior HLP but no recent lipids.    Father had MI/stents in his 50s,  at 72.  MGF  of chf in hs 80s.  No prior smoking history.  No history of diabetes.  No history of autoimmune disease such as lupus or rheumatoid arthritis.  No chronic kidney disease.  No ETOH overuse. Prior heavy etoh, sober 6 years.  No caffeine overuse. Soda.  No more recreation substance use.      Here with his mother, his " support.    DATA REVIEWED by me:  ECG (my personal interpretation) [id number]  Sinus, 91, LPFB, delayed R wave progression, T wave inversion    Echo [facility] [place] 9-  Severely dilated LV <20%, RV moderately dilated, severe LAE, mild to mod TR, mod MR, RVSP 70, RAP 20, left pleural effusion, small dimitry effusion, flat septum.    RUQ US 6-3-2020  Cholelithiasis is present.  The pancreas is not well seen.  The liver appears enlarged.  The portal vein is not reliably imaged.    US UE 10-4-2020  Occlusive thrombus within the right internal jugular vein  Nonocclusive thrombus within the right median cubital vein.    Most recent labs:       Lab Results   Component Value Date/Time    WBC 4.8 11/05/2020 03:48 PM    HEMOGLOBIN 13.7 (L) 11/05/2020 03:48 PM    HEMATOCRIT 45.0 11/05/2020 03:48 PM    MCV 93.0 11/05/2020 03:48 PM    INR 2.76 12/17/2020 08:55 AM      Lab Results   Component Value Date/Time    SODIUM 138 12/17/2020 08:55 AM    POTASSIUM 4.3 12/17/2020 08:55 AM    CHLORIDE 101 12/17/2020 08:55 AM    CO2 24 12/17/2020 08:55 AM    GLUCOSE 110 (H) 12/17/2020 08:55 AM    BUN 25 (H) 12/17/2020 08:55 AM    CREATININE 0.9 12/17/2020 08:55 AM      Lab Results   Component Value Date/Time    ASTSGOT 41 (H) 12/17/2020 08:55 AM    ALTSGPT 64 12/17/2020 08:55 AM    ALBUMIN 3.7 12/17/2020 08:55 AM      Lab Results   Component Value Date/Time    CHOLSTRLTOT 248 (H) 12/17/2020 08:55 AM     (H) 12/17/2020 08:55 AM    HDL 33.0 (L) 12/17/2020 08:55 AM    TRIGLYCERIDE 211 (H) 12/17/2020 08:55 AM     No results for input(s): NTPROBNP, TROPONINT in the last 72 hours.      Past Medical History:   Diagnosis Date   • Cardiomegaly    • Congestive heart failure (HCC)    • Decreased cardiac ejection fraction     15-20%   • Ear pain    • Hypertension    • Insomnia    • Pulmonary embolism and infarction (HCC)    • Systolic heart failure (HCC)      No past surgical history on file.  Family History   Problem Relation Age of Onset   •  Heart Disease Father    • Heart Attack Father         Stents in 50s     Social History     Socioeconomic History   • Marital status: Single     Spouse name: Not on file   • Number of children: Not on file   • Years of education: Not on file   • Highest education level: Not on file   Occupational History   • Not on file   Social Needs   • Financial resource strain: Not on file   • Food insecurity     Worry: Patient refused     Inability: Patient refused   • Transportation needs     Medical: Patient refused     Non-medical: Patient refused   Tobacco Use   • Smoking status: Never Smoker   • Smokeless tobacco: Never Used   Substance and Sexual Activity   • Alcohol use: Not Currently   • Drug use: Not Currently     Comment: Hx of daily cocaine use   • Sexual activity: Not Currently   Lifestyle   • Physical activity     Days per week: Not on file     Minutes per session: Not on file   • Stress: Not on file   Relationships   • Social connections     Talks on phone: Not on file     Gets together: Not on file     Attends Rastafarian service: Not on file     Active member of club or organization: Not on file     Attends meetings of clubs or organizations: Not on file     Relationship status: Not on file   • Intimate partner violence     Fear of current or ex partner: Not on file     Emotionally abused: Not on file     Physically abused: Not on file     Forced sexual activity: Not on file   Other Topics Concern   • Not on file   Social History Narrative   • Not on file     No Known Allergies    Current Outpatient Medications   Medication Sig Dispense Refill   • torsemide (DEMADEX) 20 MG Tab Take 2 Tabs by mouth every day. 200 Tab 3   • spironolactone (ALDACTONE) 25 MG Tab Take 1 Tab by mouth every day. 100 Tab 3   • carvedilol (COREG) 12.5 MG Tab Take 1 Tab by mouth 2 times a day, with meals. 200 Tab 3   • lisinopril (PRINIVIL) 2.5 MG Tab Take 1 Tab by mouth every day. 100 Tab 3   • metOLazone (ZAROXOLYN) 5 MG Tab Take 1 Tab by  mouth 1 time daily as needed.     • warfarin (COUMADIN) 5 MG Tab Take 10 mg by mouth every day.     • ondansetron (ZOFRAN ODT) 4 MG TABLET DISPERSIBLE Take 1 Tab by mouth every 6 hours as needed for Nausea. 20 Tab 5   • acetaminophen (TYLENOL) 500 MG Tab Take 1,000 mg by mouth every 6 hours as needed.     • pantoprazole (PROTONIX) 40 MG Tablet Delayed Response Take 1 Tab by mouth every day. 30 Tab 0   • docusate sodium 100 MG Cap Take 100 mg by mouth 2 Times a Day. 60 Cap 0     No current facility-administered medications for this visit.        ROS    All others systems reviewed and negative.     Objective:     There were no vitals taken for this visit. There is no height or weight on file to calculate BMI.    General: No acute distress. Well nourished.  HEENT: EOM grossly intact, no scleral icterus, no pharyngeal erythema.   Neck:  No JVD, no bruits, trachea midline  CVS: RRR. Normal S1, S2. No M/R/G. No LE edema.  2+ radial pulses, 2+ PT pulses  Resp: CTAB. No wheezing or crackles/rhonchi. Normal respiratory effort.  Abdomen: Soft, NT, no karrie hepatomegaly.  MSK/Ext: No clubbing or cyanosis.  Skin: Warm and dry, bandage RLE  Neurological: CN III-XII grossly intact. No focal deficits.   Psych: A&O x 3, appropriate affect, good judgement    Physical exam performed today and unchanged, except what is noted, compared to 11-        Assessment:     1. Chronic HFrEF (heart failure with reduced ejection fraction) (HCC)     2. Drug-induced cardiomyopathy (HCC)     3. Family history of coronary artery disease in father     4. Other chest pain     5. Pulmonary embolism, other, unspecified chronicity, unspecified whether acute cor pulmonale present (HCC)     6. Palpitations     7. Other secondary pulmonary hypertension (HCC)     8. Intracardiac thrombus     9. Hx of non-ST elevation myocardial infarction (NSTEMI)     10. Tricuspid valve regurgitation, nonrheumatic     11. Non-rheumatic mitral regurgitation     12.  Pericardial effusion     13. Anticoagulated     14. History of cocaine abuse (HCC)         Medical Decision Making:  Today's Assessment / Status / Plan:     -Dry weight appears to be about 215, see below  -needs eventual FU echo, I would say 3 months, ordered today for Feb 2020  -Then consider ICD  -Has been sober, good job  -Atypical CP, not concerned  -Rare palpitations, better.  -Stay on warfarin  -adjust CHF meds, see below  -Lipids and CMP  -He starting to ride stationary bike now  -RTC 3-4 weeks      Written instructions given today:      -Cut your lisinopril from 5 to 2.5 mg daily    -Increase your carvedilol from 6.25 mg AM and PM to 12.5 mg AM and PM (take 2 pills twice a day until gone)    -Reduce spironolactone from 25 mg AM and PM to just once daily    -Reduce your torsemide from 100 mg every other daily to 40 mg once daily, hand on to your old 100 mg, don't throw out    -Do not use zaroxlyn unless we instruct you to do so, hold onto it.    -If your weight goes over 218 pounds, increase your torsemide from 40 mg to 60 mg until it is back down to 215.      -You might determine that you need torsemide 60 mg every day to maintain weight at 215 pounds, if that is the case, let us know.      No follow-ups on file.    It is my pleasure to participate in the care of Mr. Rod.  Please do not hesitate to contact me with questions or concerns.    Karli Grace MD, [facility]  Cardiologist [facility]    Please note that this dictation was created using voice recognition software. I have made every reasonable attempt to correct obvious errors, but it is possible there are errors of grammar and possibly content that I did not discover before finalizing the note.

## 2021-01-18 ENCOUNTER — ANTICOAGULATION MONITORING (OUTPATIENT)
Dept: VASCULAR LAB | Facility: MEDICAL CENTER | Age: 36
End: 2021-01-18

## 2021-01-18 DIAGNOSIS — I51.3 INTRACARDIAC THROMBUS: ICD-10-CM

## 2021-01-19 NOTE — PROGRESS NOTES
OP Anticoagulation Service Note    Date: 2021    Anticoagulation Summary  As of 2021    INR goal:  2.0-3.0   TTR:  58.0 % (4.1 wk)   INR used for dosin.47 (2021)   Warfarin maintenance plan:  12.5 mg (5 mg x 2.5) every e, Thu; 10 mg (5 mg x 2) all other days   Weekly warfarin total:  75 mg   Plan last modified:  Diego Grace, PharmD (2021)   Next INR check:  2021   Target end date:  Indefinite    Indications    Intracardiac thrombus [I51.3]             Anticoagulation Episode Summary     INR check location:  Anticoagulation Clinic    Preferred lab:      Send INR reminders to:      Comments:        Anticoagulation Care Providers     Provider Role Specialty Phone number    [facility]   [phone]    BENJAMIN Davidson  [facility] [phone]        Anticoagulation Patient Findings      This appointment was conducted over the phone.     HPI:   The reason for today's call is to prevent morbidity and mortality from a blood clot and/or stroke and to reduce the risk of bleeding while on a anticoagulant.     PCP:  BENJAMIN Davidson  [address] [place], [place] [address]  [facility] [address]    Assessment:     • INR  sub-therapeutic.       Current Outpatient Medications:   •  torsemide, 40 mg, Oral, DAILY  •  spironolactone, 25 mg, Oral, DAILY  •  carvedilol, 12.5 mg, Oral, BID WITH MEALS  •  lisinopril, 2.5 mg, Oral, DAILY  •  metOLazone, 5 mg, Oral, QDAY PRN  •  warfarin, 10 mg, Oral, DAILY  •  ondansetron, 4 mg, Oral, Q6HRS PRN  •  acetaminophen, 1,000 mg, Oral, Q6HRS PRN  •  pantoprazole, 40 mg, Oral, DAILY  •  docusate sodium, 100 mg, Oral, BID      Plan:     • 15 mg x 2 then resume normal dose.    Follow-up:     • Our protocol suggests we test in 1 weeks.        Additional information discussed with patient:     • Asked patient to please call the anticoagulation clinic if they have any signs/symptoms of bleeding and/or thrombosis or any changes to  diet or medications.      National recommendations regarding anticoagulation therapy:     The CHEST guidelines recommends frequent INR monitoring at regular intervals (a few days up to a max of 12 weeks) to ensure patients are on the proper dose of warfarin, and patients are not having any complications from therapy.  INRs can dramatically change over a short time period due to diet, medications, and medical conditions.       Diego Grace, PharmD, MS, BCACP, [facility]  Phone [phone] fax [phone]    This note was created using voice recognition software (Dragon). The accuracy of the dictation is limited by the abilities of the software. I have reviewed the note prior to signing, however some errors in grammar and context are still possible. If you have any questions related to this note please do not hesitate to contact our office.

## 2021-02-02 ENCOUNTER — TELEPHONE (OUTPATIENT)
Dept: CARDIOLOGY | Facility: MEDICAL CENTER | Age: 36
End: 2021-02-02

## 2021-02-02 NOTE — TELEPHONE ENCOUNTER
Attempted to call patient in regards towards a standing echo order, called the phone number on the chart and it said it was not a working number. No way to get into contact with him.

## 2021-02-08 ENCOUNTER — TELEMEDICINE (OUTPATIENT)
Dept: CARDIOLOGY | Facility: CLINIC | Age: 36
End: 2021-02-08
Payer: MEDICAID

## 2021-02-08 VITALS — BODY MASS INDEX: 30.48 KG/M2 | HEIGHT: 72 IN | WEIGHT: 225 LBS

## 2021-02-08 DIAGNOSIS — I51.3 INTRACARDIAC THROMBUS: ICD-10-CM

## 2021-02-08 DIAGNOSIS — Z79.01 ANTICOAGULATED: ICD-10-CM

## 2021-02-08 DIAGNOSIS — I42.7 DRUG-INDUCED CARDIOMYOPATHY (HCC): ICD-10-CM

## 2021-02-08 DIAGNOSIS — I50.22 CHRONIC SYSTOLIC CONGESTIVE HEART FAILURE (HCC): ICD-10-CM

## 2021-02-08 DIAGNOSIS — I34.0 NON-RHEUMATIC MITRAL REGURGITATION: ICD-10-CM

## 2021-02-08 DIAGNOSIS — I27.29 OTHER SECONDARY PULMONARY HYPERTENSION (HCC): ICD-10-CM

## 2021-02-08 DIAGNOSIS — I26.99 PULMONARY EMBOLISM, OTHER, UNSPECIFIED CHRONICITY, UNSPECIFIED WHETHER ACUTE COR PULMONALE PRESENT (HCC): ICD-10-CM

## 2021-02-08 DIAGNOSIS — Z82.49 FAMILY HISTORY OF CORONARY ARTERY DISEASE IN FATHER: ICD-10-CM

## 2021-02-08 DIAGNOSIS — R07.89 OTHER CHEST PAIN: ICD-10-CM

## 2021-02-08 DIAGNOSIS — I25.2 HX OF NON-ST ELEVATION MYOCARDIAL INFARCTION (NSTEMI): ICD-10-CM

## 2021-02-08 DIAGNOSIS — E78.2 MIXED HYPERLIPIDEMIA: ICD-10-CM

## 2021-02-08 DIAGNOSIS — I36.1 TRICUSPID VALVE REGURGITATION, NONRHEUMATIC: ICD-10-CM

## 2021-02-08 DIAGNOSIS — R00.2 PALPITATIONS: ICD-10-CM

## 2021-02-08 DIAGNOSIS — I31.39 PERICARDIAL EFFUSION: ICD-10-CM

## 2021-02-08 PROCEDURE — 99214 OFFICE O/P EST MOD 30 MIN: CPT | Mod: CR | Performed by: INTERNAL MEDICINE

## 2021-02-08 ASSESSMENT — FIBROSIS 4 INDEX: FIB4 SCORE: 0.92

## 2021-02-08 NOTE — LETTER
"     [facility] [place] [address],   [address]  [place] [address]  Phone: [phone]  Fax: [phone]              David Rod  1985    Encounter Date: 2/8/2021    Karli Grace M.D.          PROGRESS NOTE:  Subjective:   Chief Complaint:   Chief Complaint   Patient presents with   • Cardiomyopathy (Non-ischemic)     4WK F/V DX: DRUG-INDUCED CARDIOMYOPATHY     This evaluation was conducted via Zoom using secure and encrypted videoconferencing technology. The patient was in a private location in the [facility].    The patient's identity was confirmed and verbal consent was obtained for this virtual visit.    David Rod is a 35 y.o. male who returns for dilated CMO, BiV failure, NSTEMI, prior PE, possible LV thrombus, anticoagulation, HTN    My first visit with him early Nov 2020, he was in [facility] 4 CHF.  We changed meds, he lost 40-45 pounds in 2 weeks.  Weight was as high as 260, was previously 220 leaving the hospital, got down to 215, now 220-225 but he thinks healthy weight.    LE edema resolved, did have wound and needed wound care but now healed up.  Was in a W/C, no longer  Now [facility] 2, can go around the block.  He is walking around the block now twice a day.    Has been on torsemide and jada and daily metolazone though it was intended PRN.    He starting to ride stationary bike now.    Has MI in Feb due to drug use, has dilated CMO, EF < 20%, biV failure  NSTEMI was not Type I MI.  Was told \"blood clot in my lung.\" Later coughing up blood.  On warfarin.  No LV thrombus on echo 9-2020.    Feels 70% himself.    Echo [facility] [place] 9-  Severely dilated LV <20%, RV moderately dilated, severe LAE, mild to mod TR, mod MR, RVSP 70, RAP 20, left pleural effusion, small dimitry effusion, flat septum.    Passive liver congestion.    Has HTN, checking at home sometimes.  Gets a little lightheaded after bending over and " "coming up.    Has atypical CP, happens at rest, a little shock, does not last long.  No stroke/TIA like symptoms.    Gets heart racing about once a week when \"I over do it.\" Not as bad after water weight is down.    Had fallen due to leg weakness but not syncope.    Has a venous clot removed from arm after some kind of line.  US UE 10-4-2020 Occlusive thrombus within the right internal jugular vein, nonocclusive thrombus within the right median cubital vein.    Has HLP, LDL high, TG high, HDL low, no meds yet.  Father's  had HLP.    Father had MI/stents in his 50s,  at 72.  MGF  of chf in hs 80s.  No prior smoking history.  No history of diabetes.  No history of autoimmune disease such as lupus or rheumatoid arthritis.  No chronic kidney disease.  No ETOH overuse. Prior heavy etoh, sober 6 years.  No caffeine overuse. Soda.  No more recreation substance use.      Here with his mother, his support.    DATA REVIEWED by me:  ECG (my personal interpretation) [id number]  Sinus, 91, LPFB, delayed R wave progression, T wave inversion      Echo [facility] 2020  Severely dilated LV <20%, RV moderately dilated, severe LAE, mild to mod TR, mod MR, RVSP 70, RAP 20, left pleural effusion, small dimitry effusion, flat septum.    RUQ US 6-3-2020  Cholelithiasis is present.  The pancreas is not well seen.  The liver appears enlarged.  The portal vein is not reliably imaged.    US UE 10-4-2020  Occlusive thrombus within the right internal jugular vein  Nonocclusive thrombus within the right median cubital vein.    Most recent labs:       Lab Results   Component Value Date/Time    WBC 4.8 2020 03:48 PM    HEMOGLOBIN 13.7 (L) 2020 03:48 PM    HEMATOCRIT 45.0 2020 03:48 PM    MCV 93.0 2020 03:48 PM    INR 1.47 2021 09:38 AM      Lab Results   Component Value Date/Time    SODIUM 138 2020 08:55 AM    POTASSIUM 4.3 2020 08:55 AM    CHLORIDE 101 2020 08:55 AM    CO2 24 " 12/17/2020 08:55 AM    GLUCOSE 110 (H) 12/17/2020 08:55 AM    BUN 25 (H) 12/17/2020 08:55 AM    CREATININE 0.9 12/17/2020 08:55 AM      Lab Results   Component Value Date/Time    ASTSGOT 41 (H) 12/17/2020 08:55 AM    ALTSGPT 64 12/17/2020 08:55 AM    ALBUMIN 3.7 12/17/2020 08:55 AM      Lab Results   Component Value Date/Time    CHOLSTRLTOT 248 (H) 12/17/2020 08:55 AM     (H) 12/17/2020 08:55 AM    HDL 33.0 (L) 12/17/2020 08:55 AM    TRIGLYCERIDE 211 (H) 12/17/2020 08:55 AM     No results for input(s): NTPROBNP, TROPONINT in the last 72 hours.      Past Medical History:   Diagnosis Date   • Cardiomegaly    • Congestive heart failure (HCC)    • Decreased cardiac ejection fraction     15-20%   • Ear pain    • Hypertension    • Insomnia    • Pulmonary embolism and infarction (HCC)    • Systolic heart failure (HCC)      History reviewed. No pertinent surgical history.  Family History   Problem Relation Age of Onset   • Heart Disease Father    • Heart Attack Father         Stents in 50s     Social History     Socioeconomic History   • Marital status: Single     Spouse name: Not on file   • Number of children: Not on file   • Years of education: Not on file   • Highest education level: Not on file   Occupational History   • Not on file   Social Needs   • Financial resource strain: Not on file   • Food insecurity     Worry: Patient refused     Inability: Patient refused   • Transportation needs     Medical: Patient refused     Non-medical: Patient refused   Tobacco Use   • Smoking status: Never Smoker   • Smokeless tobacco: Never Used   Substance and Sexual Activity   • Alcohol use: Not Currently   • Drug use: Not Currently     Comment: Hx of daily cocaine use   • Sexual activity: Not Currently   Lifestyle   • Physical activity     Days per week: Not on file     Minutes per session: Not on file   • Stress: Not on file   Relationships   • Social connections     Talks on phone: Not on file     Gets together: Not on  file     Attends Zoroastrianism service: Not on file     Active member of club or organization: Not on file     Attends meetings of clubs or organizations: Not on file     Relationship status: Not on file   • Intimate partner violence     Fear of current or ex partner: Not on file     Emotionally abused: Not on file     Physically abused: Not on file     Forced sexual activity: Not on file   Other Topics Concern   • Not on file   Social History Narrative   • Not on file     No Known Allergies    Current Outpatient Medications   Medication Sig Dispense Refill   • torsemide (DEMADEX) 20 MG Tab Take 2 Tabs by mouth every day. 200 Tab 3   • spironolactone (ALDACTONE) 25 MG Tab Take 1 Tab by mouth every day. 100 Tab 3   • carvedilol (COREG) 12.5 MG Tab Take 1 Tab by mouth 2 times a day, with meals. 200 Tab 3   • lisinopril (PRINIVIL) 2.5 MG Tab Take 1 Tab by mouth every day. 100 Tab 3   • metOLazone (ZAROXOLYN) 5 MG Tab Take 1 Tab by mouth 1 time daily as needed.     • warfarin (COUMADIN) 5 MG Tab Take 10 mg by mouth every day.     • ondansetron (ZOFRAN ODT) 4 MG TABLET DISPERSIBLE Take 1 Tab by mouth every 6 hours as needed for Nausea. 20 Tab 5   • acetaminophen (TYLENOL) 500 MG Tab Take 1,000 mg by mouth every 6 hours as needed.     • pantoprazole (PROTONIX) 40 MG Tablet Delayed Response Take 1 Tab by mouth every day. 30 Tab 0   • docusate sodium 100 MG Cap Take 100 mg by mouth 2 Times a Day. 60 Cap 0     No current facility-administered medications for this visit.        ROS    All others systems reviewed and negative.     Objective:     Ht 1.829 m (6')   Wt 102 kg (225 lb)  Body mass index is 30.52 kg/m².    Vitals obtained by patient:    Physical Exam:  General: No acute distress. Well nourished.   HEENT: EOM grossly intact, no scleral icterus, no pharyngeal erythema.  Phonation normal.  Neck:  No JVD noted at 90 degrees, trachea midline, no obvious masses, moves freely without pain.  CVS: Pulse as reported by patient,  no visible LE edema.  Resp: Unlabored respiratory effort, no cough or audible wheeze  MSK/Ext: No clubbing or cyanosis visible appreciated.  Skin: No rashes in visible areas.  Neurological: CN III-XII grossly intact. No focal deficits.   Psych: A&O x 3, appropriate affect, good judgement, well groomed      Assessment:     1. Chronic systolic congestive heart failure (HCC)  Basic Metabolic Panel   2. Drug-induced cardiomyopathy (HCC)     3. Family history of coronary artery disease in father     4. Other chest pain     5. Palpitations     6. Pulmonary embolism, other, unspecified chronicity, unspecified whether acute cor pulmonale present (HCC)     7. Intracardiac thrombus     8. Other secondary pulmonary hypertension (HCC)     9. Tricuspid valve regurgitation, nonrheumatic     10. Hx of non-ST elevation myocardial infarction (NSTEMI)     11. Non-rheumatic mitral regurgitation     12. Pericardial effusion     13. Anticoagulated     14. Mixed hyperlipidemia         Medical Decision Making:  Today's Assessment / Status / Plan:     -Dry weight appears to be about 220-225  -needs FU echo for EF, then consider ICD but would need UDS prior, echo already ordered  -Has been sober, good job  -Atypical CP, not concerned  -Rare palpitations, better.  -Stay on warfarin, sees vascular clinic  -Has strong FH CAD and abnormal cholesterol but still young, will address in 40s.  -RTC 3 months      Written instructions given today:      -If your weight gets up over 230 pounds, contact my office.    -Non fasting blood work 1 month    -Echo soon, I need to see how your heart pump function has responded, if the ejection fraction is still under 35% that we need to discuss your risk for electrical problems and discuss placing a defibrillator in the chest to protect you.    -See me back in 3 months        Return in about 3 months (around 5/8/2021).    It is my pleasure to participate in the care of Mr. Rod.  Please do not hesitate to  contact me with questions or concerns.    Karli Grace MD, [facility]  Cardiologist [facility]    Please note that this dictation was created using voice recognition software. I have made every reasonable attempt to correct obvious errors, but it is possible there are errors of grammar and possibly content that I did not discover before finalizing the note.        Tanja Becerril A.P.R.N.  [address] [facility] [address]  [place] [address]  Via In Basket

## 2021-02-08 NOTE — PROGRESS NOTES
"Subjective:   Chief Complaint:   Chief Complaint   Patient presents with   • Cardiomyopathy (Non-ischemic)     4WK F/V DX: DRUG-INDUCED CARDIOMYOPATHY     This evaluation was conducted via Zoom using secure and encrypted videoconferencing technology. The patient was in a private location in the [facility].    The patient's identity was confirmed and verbal consent was obtained for this virtual visit.    David Rod is a 35 y.o. male who returns for dilated CMO, BiV failure, NSTEMI, prior PE, possible LV thrombus, anticoagulation, HTN    My first visit with him early Nov 2020, he was in [facility] 4 CHF.  We changed meds, he lost 40-45 pounds in 2 weeks.  Weight was as high as 260, was previously 220 leaving the hospital, got down to 215, now 220-225 but he thinks healthy weight.    LE edema resolved, did have wound and needed wound care but now healed up.  Was in a W/C, no longer  Now [facility] 2, can go around the block.  He is walking around the block now twice a day.    Has been on torsemide and jada and daily metolazone though it was intended PRN.    He starting to ride stationary bike now.    Has MI in Feb due to drug use, has dilated CMO, EF < 20%, biV failure  NSTEMI was not Type I MI.  Was told \"blood clot in my lung.\" Later coughing up blood.  On warfarin.  No LV thrombus on echo 9-2020.    Feels 70% himself.    Echo [facility] 9-  Severely dilated LV <20%, RV moderately dilated, severe LAE, mild to mod TR, mod MR, RVSP 70, RAP 20, left pleural effusion, small dimitry effusion, flat septum.    Passive liver congestion.    Has HTN, checking at home sometimes.  Gets a little lightheaded after bending over and coming up.    Has atypical CP, happens at rest, a little shock, does not last long.  No stroke/TIA like symptoms.    Gets heart racing about once a week when \"I over do it.\" Not as bad after water weight is down.    Had fallen due to leg weakness but not syncope.    Has a venous clot removed from " arm after some kind of line.  US UE 10-4-2020 Occlusive thrombus within the right internal jugular vein, nonocclusive thrombus within the right median cubital vein.    Has HLP, LDL high, TG high, HDL low, no meds yet.  Father's  had HLP.    Father had MI/stents in his 50s,  at 72.  MGF  of chf in hs 80s.  No prior smoking history.  No history of diabetes.  No history of autoimmune disease such as lupus or rheumatoid arthritis.  No chronic kidney disease.  No ETOH overuse. Prior heavy etoh, sober 6 years.  No caffeine overuse. Soda.  No more recreation substance use.      Here with his mother, his support.    DATA REVIEWED by me:  ECG (my personal interpretation) [id number]  Sinus, 91, LPFB, delayed R wave progression, T wave inversion      Echo [facility] 2020  Severely dilated LV <20%, RV moderately dilated, severe LAE, mild to mod TR, mod MR, RVSP 70, RAP 20, left pleural effusion, small dimitry effusion, flat septum.    RUQ US 6-3-2020  Cholelithiasis is present.  The pancreas is not well seen.  The liver appears enlarged.  The portal vein is not reliably imaged.    US UE 10-4-2020  Occlusive thrombus within the right internal jugular vein  Nonocclusive thrombus within the right median cubital vein.    Most recent labs:       Lab Results   Component Value Date/Time    WBC 4.8 2020 03:48 PM    HEMOGLOBIN 13.7 (L) 2020 03:48 PM    HEMATOCRIT 45.0 2020 03:48 PM    MCV 93.0 2020 03:48 PM    INR 1.47 2021 09:38 AM      Lab Results   Component Value Date/Time    SODIUM 138 2020 08:55 AM    POTASSIUM 4.3 2020 08:55 AM    CHLORIDE 101 2020 08:55 AM    CO2 24 2020 08:55 AM    GLUCOSE 110 (H) 2020 08:55 AM    BUN 25 (H) 2020 08:55 AM    CREATININE 0.9 2020 08:55 AM      Lab Results   Component Value Date/Time    ASTSGOT 41 (H) 2020 08:55 AM    ALTSGPT 64 2020 08:55 AM    ALBUMIN 3.7 2020 08:55 AM      Lab Results    Component Value Date/Time    CHOLSTRLTOT 248 (H) 12/17/2020 08:55 AM     (H) 12/17/2020 08:55 AM    HDL 33.0 (L) 12/17/2020 08:55 AM    TRIGLYCERIDE 211 (H) 12/17/2020 08:55 AM     No results for input(s): NTPROBNP, TROPONINT in the last 72 hours.      Past Medical History:   Diagnosis Date   • Cardiomegaly    • Congestive heart failure (HCC)    • Decreased cardiac ejection fraction     15-20%   • Ear pain    • Hypertension    • Insomnia    • Pulmonary embolism and infarction (HCC)    • Systolic heart failure (HCC)      History reviewed. No pertinent surgical history.  Family History   Problem Relation Age of Onset   • Heart Disease Father    • Heart Attack Father         Stents in 50s     Social History     Socioeconomic History   • Marital status: Single     Spouse name: Not on file   • Number of children: Not on file   • Years of education: Not on file   • Highest education level: Not on file   Occupational History   • Not on file   Social Needs   • Financial resource strain: Not on file   • Food insecurity     Worry: Patient refused     Inability: Patient refused   • Transportation needs     Medical: Patient refused     Non-medical: Patient refused   Tobacco Use   • Smoking status: Never Smoker   • Smokeless tobacco: Never Used   Substance and Sexual Activity   • Alcohol use: Not Currently   • Drug use: Not Currently     Comment: Hx of daily cocaine use   • Sexual activity: Not Currently   Lifestyle   • Physical activity     Days per week: Not on file     Minutes per session: Not on file   • Stress: Not on file   Relationships   • Social connections     Talks on phone: Not on file     Gets together: Not on file     Attends [employer] service: Not on file     Active member of club or organization: Not on file     Attends meetings of clubs or organizations: Not on file     Relationship status: Not on file   • Intimate partner violence     Fear of current or ex partner: Not on file     Emotionally abused:  Not on file     Physically abused: Not on file     Forced sexual activity: Not on file   Other Topics Concern   • Not on file   Social History Narrative   • Not on file     No Known Allergies    Current Outpatient Medications   Medication Sig Dispense Refill   • torsemide (DEMADEX) 20 MG Tab Take 2 Tabs by mouth every day. 200 Tab 3   • spironolactone (ALDACTONE) 25 MG Tab Take 1 Tab by mouth every day. 100 Tab 3   • carvedilol (COREG) 12.5 MG Tab Take 1 Tab by mouth 2 times a day, with meals. 200 Tab 3   • lisinopril (PRINIVIL) 2.5 MG Tab Take 1 Tab by mouth every day. 100 Tab 3   • metOLazone (ZAROXOLYN) 5 MG Tab Take 1 Tab by mouth 1 time daily as needed.     • warfarin (COUMADIN) 5 MG Tab Take 10 mg by mouth every day.     • ondansetron (ZOFRAN ODT) 4 MG TABLET DISPERSIBLE Take 1 Tab by mouth every 6 hours as needed for Nausea. 20 Tab 5   • acetaminophen (TYLENOL) 500 MG Tab Take 1,000 mg by mouth every 6 hours as needed.     • pantoprazole (PROTONIX) 40 MG Tablet Delayed Response Take 1 Tab by mouth every day. 30 Tab 0   • docusate sodium 100 MG Cap Take 100 mg by mouth 2 Times a Day. 60 Cap 0     No current facility-administered medications for this visit.        ROS    All others systems reviewed and negative.     Objective:     Ht 1.829 m (6')   Wt 102 kg (225 lb)  Body mass index is 30.52 kg/m².    Vitals obtained by patient:    Physical Exam:  General: No acute distress. Well nourished.   HEENT: EOM grossly intact, no scleral icterus, no pharyngeal erythema.  Phonation normal.  Neck:  No JVD noted at 90 degrees, trachea midline, no obvious masses, moves freely without pain.  CVS: Pulse as reported by patient, no visible LE edema.  Resp: Unlabored respiratory effort, no cough or audible wheeze  MSK/Ext: No clubbing or cyanosis visible appreciated.  Skin: No rashes in visible areas.  Neurological: CN III-XII grossly intact. No focal deficits.   Psych: A&O x 3, appropriate affect, good judgement, well  groomed      Assessment:     1. Chronic systolic congestive heart failure (HCC)  Basic Metabolic Panel   2. Drug-induced cardiomyopathy (HCC)     3. Family history of coronary artery disease in father     4. Other chest pain     5. Palpitations     6. Pulmonary embolism, other, unspecified chronicity, unspecified whether acute cor pulmonale present (HCC)     7. Intracardiac thrombus     8. Other secondary pulmonary hypertension (HCC)     9. Tricuspid valve regurgitation, nonrheumatic     10. Hx of non-ST elevation myocardial infarction (NSTEMI)     11. Non-rheumatic mitral regurgitation     12. Pericardial effusion     13. Anticoagulated     14. Mixed hyperlipidemia         Medical Decision Making:  Today's Assessment / Status / Plan:     -Dry weight appears to be about 220-225  -needs FU echo for EF, then consider ICD but would need UDS prior, echo already ordered  -Has been sober, good job  -Atypical CP, not concerned  -Rare palpitations, better.  -Stay on warfarin, sees vascular clinic  -Has strong FH CAD and abnormal cholesterol but still young, will address in 40s.  -RTC 3 months      Written instructions given today:      -If your weight gets up over 230 pounds, contact my office.    -Non fasting blood work 1 month    -Echo soon, I need to see how your heart pump function has responded, if the ejection fraction is still under 35% that we need to discuss your risk for electrical problems and discuss placing a defibrillator in the chest to protect you.    -See me back in 3 months        Return in about 3 months (around 5/8/2021).    It is my pleasure to participate in the care of Mr. Rod.  Please do not hesitate to contact me with questions or concerns.    Karli Grace MD, [facility]  Cardiologist [facility]    Please note that this dictation was created using voice recognition software. I have made every reasonable attempt to correct obvious errors, but it is possible  there are errors of grammar and possibly content that I did not discover before finalizing the note.

## 2021-02-08 NOTE — PATIENT INSTRUCTIONS
-If your weight gets up over 230 pounds, contact my office.    -Non fasting blood work 1 month    -Echo soon, I need to see how your heart pump function has responded, if the ejection fraction is still under 35% that we need to discuss your risk for electrical problems and discuss placing a defibrillator in the chest to protect you.    -See me back in 3 months

## 2021-02-11 ENCOUNTER — TELEPHONE (OUTPATIENT)
Dept: VASCULAR LAB | Facility: MEDICAL CENTER | Age: 36
End: 2021-02-11

## 2021-02-25 ENCOUNTER — TELEPHONE (OUTPATIENT)
Dept: VASCULAR LAB | Facility: MEDICAL CENTER | Age: 36
End: 2021-02-25

## 2021-02-25 NOTE — LETTER
David Rod  [address] [address]    02/25/21    Dear David Rod ,    We have been unsuccessful in our attempts to contact you regarding your Anticoagulation Service appointments. Warfarin is a potent blood-thinning agent that requires monitoring to ensure that the dosage is correct for your body.  If it isn't, you could develop serious, sometimes life-threatening bleeding problems or life-threatening blood clots or stroke could result.    To monitor you effectively, we need to be able to communicate with you.  This is a requirement to be followed by our Service.       If you repeatedly fail to keep your lab appointments, you are at risk of being discharged from the Anticoagulation Service.    It is extremely important that you contact the clinic as soon as possible to arrange appropriate follow up.  We are open Monday-Friday 8 am until 5 pm.  You may reach our Service at [phone].           Sincerely,           Lonny Wing, PharmD, [facility]  Clinic Supervisor  [facility]  Outpatient Anticoagulation Service

## 2021-02-25 NOTE — TELEPHONE ENCOUNTER
Left message for pt to have INR checked  2nd call  Letter sent  Savanah Adams, Clinical Pharmacist, CDE, CACP

## 2021-02-26 ENCOUNTER — ANTICOAGULATION MONITORING (OUTPATIENT)
Dept: VASCULAR LAB | Facility: MEDICAL CENTER | Age: 36
End: 2021-02-26

## 2021-02-26 DIAGNOSIS — I51.3 INTRACARDIAC THROMBUS: ICD-10-CM

## 2021-02-27 NOTE — PROGRESS NOTES
Anticoagulation Summary  As of 2021    INR goal:  2.0-3.0   TTR:  58.5 % (2.3 mo)   INR used for dosin.76 (2021)   Warfarin maintenance plan:  12.5 mg (5 mg x 2.5) every Tue, Thu; 10 mg (5 mg x 2) all other days   Weekly warfarin total:  75 mg   Plan last modified:  Savanah Adams (2021)   Next INR check:  2021   Target end date:  Indefinite    Indications    Intracardiac thrombus [I51.3]             Anticoagulation Episode Summary     INR check location:  Anticoagulation Clinic    Preferred lab:      Send INR reminders to:      Comments:        Anticoagulation Care Providers     Provider Role Specialty Phone number    [facility]   [phone]    LEROY Davidson.  [facility] [phone]        Anticoagulation Patient Findings          Spoke with David to report a therapeutic INR of 2.76. Continue current dosing regimen.  Follow up in 10 weeks, to reduce the risk of adverse events related to this high risk medication, warfarin.    Savanah Adams, Clinical Pharmacist

## 2021-03-16 DIAGNOSIS — Z79.01 CHRONIC ANTICOAGULATION: ICD-10-CM

## 2021-03-16 RX ORDER — WARFARIN SODIUM 5 MG/1
TABLET ORAL
Qty: 225 TABLET | Refills: 1 | Status: SHIPPED | OUTPATIENT
Start: 2021-03-16 | End: 2021-09-13

## 2021-03-29 DIAGNOSIS — I51.3 INTRACARDIAC THROMBUS: ICD-10-CM

## 2021-03-29 DIAGNOSIS — I25.2 H/O NON-ST ELEVATION MYOCARDIAL INFARCTION (NSTEMI): ICD-10-CM

## 2021-03-29 DIAGNOSIS — I50.23 ACUTE ON CHRONIC SYSTOLIC CONGESTIVE HEART FAILURE (HCC): ICD-10-CM

## 2021-03-29 DIAGNOSIS — I42.7 DRUG-INDUCED CARDIOMYOPATHY (HCC): ICD-10-CM

## 2021-04-06 ENCOUNTER — ANTICOAGULATION MONITORING (OUTPATIENT)
Dept: VASCULAR LAB | Facility: MEDICAL CENTER | Age: 36
End: 2021-04-06

## 2021-04-06 DIAGNOSIS — I51.3 INTRACARDIAC THROMBUS: ICD-10-CM

## 2021-04-07 NOTE — PROGRESS NOTES
Anticoagulation Summary  As of 4/6/2021    INR goal:  2.0-3.0   TTR:  48.5 % (3.6 mo)   INR used for dosing:  3.61 (4/6/2021)   Warfarin maintenance plan:  12.5 mg (5 mg x 2.5) every Tue, Thu; 10 mg (5 mg x 2) all other days   Weekly warfarin total:  75 mg   Plan last modified:  Savanah M Filter (2/26/2021)   Next INR check:  4/20/2021   Target end date:  Indefinite    Indications    Intracardiac thrombus [I51.3]             Anticoagulation Episode Summary     INR check location:  Anticoagulation Clinic    Preferred lab:      Send INR reminders to:      Comments:        Anticoagulation Care Providers     Provider Role Specialty Phone number    [facility]   [phone]    BENJAMIN Davidson  [facility] [phone]        Anticoagulation Patient Findings    Left voicemail message to report a supratherapeutic INR of 3.61.  Requested pt contact the clinic for any s/s of unusual bleeding, bruising, clotting or any changes to diet or medication.   Instructed patient to HOLD X 1, then resume current warfarin regimen.  FU 2 weeks.  Agapito Aguilar, TerryD, BCACP

## 2021-04-16 ENCOUNTER — TELEMEDICINE (OUTPATIENT)
Dept: CARDIOLOGY | Facility: MEDICAL CENTER | Age: 36
End: 2021-04-16
Attending: INTERNAL MEDICINE
Payer: MEDICAID

## 2021-04-16 ENCOUNTER — TELEPHONE (OUTPATIENT)
Dept: CARDIOLOGY | Facility: MEDICAL CENTER | Age: 36
End: 2021-04-16

## 2021-04-16 VITALS — WEIGHT: 250 LBS | BODY MASS INDEX: 33.91 KG/M2

## 2021-04-16 DIAGNOSIS — I50.22 CHRONIC SYSTOLIC HEART FAILURE (HCC): ICD-10-CM

## 2021-04-16 PROCEDURE — 99205 OFFICE O/P NEW HI 60 MIN: CPT | Performed by: INTERNAL MEDICINE

## 2021-04-16 ASSESSMENT — FIBROSIS 4 INDEX: FIB4 SCORE: 0.95

## 2021-04-16 NOTE — PROGRESS NOTES
Telemedicine: New Patient   This evaluation was conducted via Zoom using secure and encrypted videoconferencing technology. The patient was in a private location in the state [facility].    The patient's identity was confirmed and verbal consent was obtained for this virtual visit.    Subjective:     CC:   Chief Complaint   Patient presents with   • Cardiomyopathy (Non-ischemic)     VOD DX:CARDIOMYOPATHY       David Rod is a 36 y.o. male presenting to establish care and to discuss the evaluation and management of:    CHF    35-year-old man with nonischemic cardiomyopathy, ejection fraction 33%, prior LV thrombus and PE on chronic warfarin, presenting for evaluation for ICD.  He was first diagnosed with heart failure last year.  He had an echocardiogram in September which showed ejection fraction less than 20% in [facility].  He was started on directed medical therapy with carvedilol, lisinopril, and spironolactone.  He also stopped using cocaine.  He endorses medication compliance.  He denies palpitations, syncope or presyncope.  Able to walk around the block, [place] Heart Association class II.    ROS  12 point review of systems was conducted, positive for shortness of breath with exertion, negative in all other systems reviewed    No Known Allergies    Current medicines (including changes today)  Current Outpatient Medications   Medication Sig Dispense Refill   • warfarin (COUMADIN) 5 MG Tab Take two to two and one half (2-2.5) tablets daily as directed by [facility] 225 tablet 1   • torsemide (DEMADEX) 20 MG Tab Take 2 Tabs by mouth every day. 200 Tab 3   • spironolactone (ALDACTONE) 25 MG Tab Take 1 Tab by mouth every day. 100 Tab 3   • carvedilol (COREG) 12.5 MG Tab Take 1 Tab by mouth 2 times a day, with meals. 200 Tab 3   • lisinopril (PRINIVIL) 2.5 MG Tab Take 1 Tab by mouth every day. 100 Tab 3   • metOLazone (ZAROXOLYN) 5 MG Tab Take 1 Tab by mouth 1 time daily as  needed.     • ondansetron (ZOFRAN ODT) 4 MG TABLET DISPERSIBLE Take 1 Tab by mouth every 6 hours as needed for Nausea. 20 Tab 5   • acetaminophen (TYLENOL) 500 MG Tab Take 1,000 mg by mouth every 6 hours as needed.     • pantoprazole (PROTONIX) 40 MG Tablet Delayed Response Take 1 Tab by mouth every day. 30 Tab 0     No current facility-administered medications for this visit.       He  has a past medical history of Cardiomegaly, Congestive heart failure (HCC), Decreased cardiac ejection fraction, Ear pain, Hypertension, Insomnia, Pulmonary embolism and infarction (HCC), and Systolic heart failure (HCC).  He  has no past surgical history on file.      Family History   Problem Relation Age of Onset   • Heart Disease Father    • Heart Attack Father         Stents in 50s     Family Status   Relation Name Status   • Mo  Alive   • Fa         Patient Active Problem List    Diagnosis Date Noted   • Anticoagulated 2020   • H/O non-ST elevation myocardial infarction (NSTEMI) 2020   • Nausea 2020   • Skin lesion 2020   • Congestive heart failure (CHF) ([facility]) 10/19/2020   • Acute cholecystitis 2020   • Pneumonia 2020   • Intracardiac thrombus 2020   • Left against medical advice 2020   • Edema 2020   • Hospital discharge follow-up 2020   • Drug-induced cardiomyopathy (HCC) 2020   • Acute on chronic systolic congestive heart failure (HCC) 2020   • Pulmonary embolism (HCC) 2020   • History of cocaine abuse ([facility]) 2020          Objective:   Wt 113 kg (250 lb) Comment: PT STATES  BMI 33.91 kg/m²     Physical Exam  Awake alert and oriented x3  Breathing is unlabored  Pulse regular as reported by patient  No acute distress  Motor skills grossly intact  No obvious erythema  Mucous membranes moist  Cranial nerves appear intact    Labs reviewed in epic    Echocardiogram reviewed: Ejection fraction 33%    EKG interpreted by me: Sinus rhythm with  narrow QRS    Assessment and Plan:   The following treatment plan was discussed:     1. Chronic systolic heart failure (HCC)  - CL-IMPLANTABLE CARDIOVERTER DEFIBRILLATOR; Future    1.  Chronic systolic heart failure (patient class II  2.  Nonischemic cardiomyopathy, ejection fraction 33%  3.  Prior LV thrombus on warfarin  4.  History of PE  5.  NSTEMI due to drug use    -We discussed heart pressure ICD for prevention of sudden cardiac death.  He has been on guideline directed medical therapy for greater than 3 months and ejection fraction remains less than 35%.  He has near Heart Association class II symptoms.  -The risk, benefits, and alternatives to ICD placement were discussed in great detail, specific risks mentioned including bleeding, infection, cardiac perforation with possible tamponade requiring pericardiocentesis or open heart surgery. The [place] patient decision making tool was used to decide on ICD implantation.  In addition the possibility of lead dislodgment, inappropriate shocks, pneumothorax, hemothorax were discussed. Also mentioned were the possibility of death, stroke, and myocardial infarction. The patient verbalized understanding of these potential complications and wishes to proceed with this procedure.   -Continue warfarin, labs reviewed, continue guideline directed medical therapy with beta-blocker, ACE inhibitor, spironolactone, diuretics    Plan ICD implantation    Follow-up: No follow-ups on file.

## 2021-04-16 NOTE — TELEPHONE ENCOUNTER
Patient scheduled for ICD insert on 4-23-21 with Dr. Conroy. Patient has been instructed to check in at 8:30 for 10:30 case time. Message sent to authorizations. MARJORIE Trevino with [employer].

## 2021-04-16 NOTE — TELEPHONE ENCOUNTER
----- Message from Brandyn Conroy M.D. sent at 4/16/2021 11:04 AM PDT -----  Please schedule ICD, no meds to hold, thanks    [facility]

## 2021-04-20 ENCOUNTER — PRE-ADMISSION TESTING (OUTPATIENT)
Dept: ADMISSIONS | Facility: MEDICAL CENTER | Age: 36
End: 2021-04-20
Attending: INTERNAL MEDICINE
Payer: MEDICAID

## 2021-04-20 DIAGNOSIS — Z01.810 PRE-OPERATIVE CARDIOVASCULAR EXAMINATION: ICD-10-CM

## 2021-04-20 DIAGNOSIS — Z01.812 PRE-OPERATIVE LABORATORY EXAMINATION: ICD-10-CM

## 2021-04-20 LAB
ALBUMIN SERPL BCP-MCNC: 4.3 G/DL (ref 3.2–4.9)
ALBUMIN/GLOB SERPL: 1.2 G/DL
ALP SERPL-CCNC: 98 U/L (ref 30–99)
ALT SERPL-CCNC: 59 U/L (ref 2–50)
ANION GAP SERPL CALC-SCNC: 13 MMOL/L (ref 7–16)
AST SERPL-CCNC: 48 U/L (ref 12–45)
BILIRUB SERPL-MCNC: 0.5 MG/DL (ref 0.1–1.5)
BUN SERPL-MCNC: 18 MG/DL (ref 8–22)
CALCIUM SERPL-MCNC: 9.7 MG/DL (ref 8.5–10.5)
CHLORIDE SERPL-SCNC: 102 MMOL/L (ref 96–112)
CO2 SERPL-SCNC: 22 MMOL/L (ref 20–33)
CREAT SERPL-MCNC: 0.95 MG/DL (ref 0.5–1.4)
EKG IMPRESSION: NORMAL
ERYTHROCYTE [DISTWIDTH] IN BLOOD BY AUTOMATED COUNT: 43.4 FL (ref 35.9–50)
GLOBULIN SER CALC-MCNC: 3.7 G/DL (ref 1.9–3.5)
GLUCOSE SERPL-MCNC: 122 MG/DL (ref 65–99)
HCT VFR BLD AUTO: 42.7 % (ref 42–52)
HGB BLD-MCNC: 15 G/DL (ref 14–18)
INR PPP: 1.22 (ref 0.87–1.13)
MCH RBC QN AUTO: 30.9 PG (ref 27–33)
MCHC RBC AUTO-ENTMCNC: 35.1 G/DL (ref 33.7–35.3)
MCV RBC AUTO: 88 FL (ref 81.4–97.8)
PLATELET # BLD AUTO: 236 K/UL (ref 164–446)
PMV BLD AUTO: 11.5 FL (ref 9–12.9)
POTASSIUM SERPL-SCNC: 3.4 MMOL/L (ref 3.6–5.5)
PROT SERPL-MCNC: 8 G/DL (ref 6–8.2)
PROTHROMBIN TIME: 15.7 SEC (ref 12–14.6)
RBC # BLD AUTO: 4.85 M/UL (ref 4.7–6.1)
SARS-COV-2 RNA RESP QL NAA+PROBE: NOTDETECTED
SODIUM SERPL-SCNC: 137 MMOL/L (ref 135–145)
SPECIMEN SOURCE: NORMAL
WBC # BLD AUTO: 7.8 K/UL (ref 4.8–10.8)

## 2021-04-20 PROCEDURE — 36415 COLL VENOUS BLD VENIPUNCTURE: CPT

## 2021-04-20 PROCEDURE — C9803 HOPD COVID-19 SPEC COLLECT: HCPCS

## 2021-04-20 PROCEDURE — 80053 COMPREHEN METABOLIC PANEL: CPT

## 2021-04-20 PROCEDURE — U0005 INFEC AGEN DETEC AMPLI PROBE: HCPCS

## 2021-04-20 PROCEDURE — 85610 PROTHROMBIN TIME: CPT

## 2021-04-20 PROCEDURE — 93005 ELECTROCARDIOGRAM TRACING: CPT

## 2021-04-20 PROCEDURE — U0003 INFECTIOUS AGENT DETECTION BY NUCLEIC ACID (DNA OR RNA); SEVERE ACUTE RESPIRATORY SYNDROME CORONAVIRUS 2 (SARS-COV-2) (CORONAVIRUS DISEASE [COVID-19]), AMPLIFIED PROBE TECHNIQUE, MAKING USE OF HIGH THROUGHPUT TECHNOLOGIES AS DESCRIBED BY CMS-2020-01-R: HCPCS

## 2021-04-20 PROCEDURE — 93010 ELECTROCARDIOGRAM REPORT: CPT | Performed by: INTERNAL MEDICINE

## 2021-04-20 PROCEDURE — 85027 COMPLETE CBC AUTOMATED: CPT

## 2021-04-20 ASSESSMENT — FIBROSIS 4 INDEX: FIB4 SCORE: 0.95

## 2021-04-22 NOTE — OR NURSING
COVID-19 Pre-Surgery Screenin. Do you have an undiagnosed respiratory illness or symptoms such as coughing, sneezing or sore throat? Any loss of sense of smell or taste? NO     2. Do you have an unexplained fever greater than 100.4 degrees Fahrenheit or 38 degrees Celsius? NO  ?  3. Have you had direct exposure to a patient who tested positive for Covid-19?NO    4. Patient informed of current visitation and mask policies by this RN.

## 2021-04-23 ENCOUNTER — APPOINTMENT (OUTPATIENT)
Dept: RADIOLOGY | Facility: MEDICAL CENTER | Age: 36
End: 2021-04-23
Attending: INTERNAL MEDICINE
Payer: MEDICAID

## 2021-04-23 ENCOUNTER — HOSPITAL ENCOUNTER (OUTPATIENT)
Dept: RADIOLOGY | Facility: MEDICAL CENTER | Age: 36
End: 2021-04-23
Attending: INTERNAL MEDICINE | Admitting: INTERNAL MEDICINE
Payer: MEDICAID

## 2021-04-23 ENCOUNTER — APPOINTMENT (OUTPATIENT)
Dept: CARDIOLOGY | Facility: MEDICAL CENTER | Age: 36
End: 2021-04-23
Attending: INTERNAL MEDICINE
Payer: MEDICAID

## 2021-04-23 ENCOUNTER — HOSPITAL ENCOUNTER (OUTPATIENT)
Facility: MEDICAL CENTER | Age: 36
End: 2021-04-23
Attending: INTERNAL MEDICINE | Admitting: INTERNAL MEDICINE
Payer: MEDICAID

## 2021-04-23 ENCOUNTER — TELEPHONE (OUTPATIENT)
Dept: VASCULAR LAB | Facility: MEDICAL CENTER | Age: 36
End: 2021-04-23

## 2021-04-23 VITALS
DIASTOLIC BLOOD PRESSURE: 75 MMHG | RESPIRATION RATE: 18 BRPM | SYSTOLIC BLOOD PRESSURE: 122 MMHG | WEIGHT: 282.63 LBS | OXYGEN SATURATION: 96 % | HEIGHT: 72 IN | TEMPERATURE: 98.1 F | BODY MASS INDEX: 38.28 KG/M2 | HEART RATE: 112 BPM

## 2021-04-23 DIAGNOSIS — I50.22 CHRONIC SYSTOLIC HEART FAILURE (HCC): ICD-10-CM

## 2021-04-23 LAB
EKG IMPRESSION: NORMAL
INR PPP: 1.42 (ref 0.87–1.13)
PROTHROMBIN TIME: 17.8 SEC (ref 12–14.6)

## 2021-04-23 PROCEDURE — 93005 ELECTROCARDIOGRAM TRACING: CPT | Mod: XE | Performed by: INTERNAL MEDICINE

## 2021-04-23 PROCEDURE — 85610 PROTHROMBIN TIME: CPT

## 2021-04-23 PROCEDURE — 99152 MOD SED SAME PHYS/QHP 5/>YRS: CPT | Performed by: INTERNAL MEDICINE

## 2021-04-23 PROCEDURE — 33249 INSJ/RPLCMT DEFIB W/LEAD(S): CPT | Performed by: INTERNAL MEDICINE

## 2021-04-23 PROCEDURE — 71045 X-RAY EXAM CHEST 1 VIEW: CPT

## 2021-04-23 PROCEDURE — 160002 HCHG RECOVERY MINUTES (STAT)

## 2021-04-23 PROCEDURE — 700101 HCHG RX REV CODE 250

## 2021-04-23 PROCEDURE — 93010 ELECTROCARDIOGRAM REPORT: CPT | Performed by: INTERNAL MEDICINE

## 2021-04-23 PROCEDURE — 700111 HCHG RX REV CODE 636 W/ 250 OVERRIDE (IP)

## 2021-04-23 PROCEDURE — C1722 AICD, SINGLE CHAMBER: HCPCS

## 2021-04-23 PROCEDURE — 93641 EP EVL 1/2CHMB PAC CVDFB TST: CPT | Mod: 53 | Performed by: INTERNAL MEDICINE

## 2021-04-23 RX ORDER — LIDOCAINE HYDROCHLORIDE 10 MG/ML
INJECTION, SOLUTION INFILTRATION; PERINEURAL
Status: COMPLETED
Start: 2021-04-23 | End: 2021-04-23

## 2021-04-23 RX ORDER — MIDAZOLAM HYDROCHLORIDE 1 MG/ML
INJECTION INTRAMUSCULAR; INTRAVENOUS
Status: COMPLETED
Start: 2021-04-23 | End: 2021-04-23

## 2021-04-23 RX ORDER — CEPHALEXIN 500 MG/1
500 CAPSULE ORAL 3 TIMES DAILY
Qty: 15 CAPSULE | Refills: 0 | Status: SHIPPED | OUTPATIENT
Start: 2021-04-23 | End: 2021-06-02

## 2021-04-23 RX ORDER — CEFAZOLIN SODIUM 1 G/3ML
INJECTION, POWDER, FOR SOLUTION INTRAMUSCULAR; INTRAVENOUS
Status: COMPLETED
Start: 2021-04-23 | End: 2021-04-23

## 2021-04-23 RX ORDER — BUPIVACAINE HYDROCHLORIDE 2.5 MG/ML
INJECTION, SOLUTION EPIDURAL; INFILTRATION; INTRACAUDAL
Status: COMPLETED
Start: 2021-04-23 | End: 2021-04-23

## 2021-04-23 RX ADMIN — MIDAZOLAM HYDROCHLORIDE 2 MG: 1 INJECTION, SOLUTION INTRAMUSCULAR; INTRAVENOUS at 11:42

## 2021-04-23 RX ADMIN — FENTANYL CITRATE 200 MCG: 50 INJECTION, SOLUTION INTRAMUSCULAR; INTRAVENOUS at 11:18

## 2021-04-23 RX ADMIN — LIDOCAINE HYDROCHLORIDE: 10 INJECTION, SOLUTION INFILTRATION; PERINEURAL at 10:29

## 2021-04-23 RX ADMIN — FENTANYL CITRATE 100 MCG: 50 INJECTION, SOLUTION INTRAMUSCULAR; INTRAVENOUS at 11:42

## 2021-04-23 RX ADMIN — MIDAZOLAM HYDROCHLORIDE 4 MG: 1 INJECTION, SOLUTION INTRAMUSCULAR; INTRAVENOUS at 11:18

## 2021-04-23 RX ADMIN — CEFAZOLIN 3000 MG: 330 INJECTION, POWDER, FOR SOLUTION INTRAMUSCULAR; INTRAVENOUS at 10:28

## 2021-04-23 RX ADMIN — BUPIVACAINE HYDROCHLORIDE: 2.5 INJECTION, SOLUTION EPIDURAL; INFILTRATION; INTRACAUDAL; PERINEURAL at 10:28

## 2021-04-23 ASSESSMENT — FIBROSIS 4 INDEX: FIB4 SCORE: 0.95

## 2021-04-23 NOTE — TELEPHONE ENCOUNTER
Left vm message to discuss anticoagulation management.  Savanah Adams, Clinical Pharmacist, CDE, CACP

## 2021-04-23 NOTE — OR NURSING
1205 Pt arrived into PPU with cath lab RN.  AAOx4.  Denies pain and nausea.  Chest dressing CDI and soft.  Sling on left arm.  Belongings returned to pt bedside.    1229 EKG done    1235 CXR done    1400 ICD interrogation done by [employer] rep    1436 d/c information given to patient and significant other.  Discussed diet, activity, follow-up, symptoms, and management.     1505 Pt ambulated to restroom, no dizziness, no nausea, no SOB.  IV d/c'd.  All questions answered.    1515 Pt d/c'd to family via wheelchair with RN.  All belongings with pt.

## 2021-04-23 NOTE — DISCHARGE INSTRUCTIONS
ACTIVITY: Rest and take it easy for the first 24 hours.  A responsible adult is recommended to remain with you during that time.  It is normal to feel sleepy.  We encourage you to not do anything that requires balance, judgment or coordination.    MILD FLU-LIKE SYMPTOMS ARE NORMAL. YOU MAY EXPERIENCE GENERALIZED MUSCLE ACHES, THROAT IRRITATION, HEADACHE AND/OR SOME NAUSEA.    FOR 24 HOURS DO NOT:  Drive, operate machinery or run household appliances.  Drink beer or alcoholic beverages.   Make important decisions or sign legal documents.    SPECIAL INSTRUCTIONS:   Subcutaneous Cardioverter Defibrillator Implantation, Care After  This sheet gives you information about how to care for yourself after your procedure. Your health care provider may also give you more specific instructions. If you have problems or questions, contact your health care provider.  What can I expect after the procedure?  After the procedure, it is common to have:  · Some pain. It may last a few days.  · A slight bump under the skin where the subcutaneous implantation cardioverter defibrillator (S-ICD) is. You may be able to feel the device under the skin. This is normal.  Follow these instructions at home:  Medicines  · Take over-the-counter and prescription medicines only as told by your health care provider.  · If you were prescribed an antibiotic medicine, take it as told by your health care provider. Do not stop taking the antibiotic even if you start to feel better.  Incision care  · Follow instructions from your health care provider about how to take care of your incision. Make sure you:  ? Wash your hands with soap and water before you change your bandage (dressing). If soap and water are not available, use hand .  ? Change your dressing as told by your health care provider.  ? Leave stitches (sutures), skin glue, or adhesive strips in place. These skin closures may need to stay in place for 2 weeks or longer. If adhesive strip  edges start to loosen and curl up, you may trim the loose edges. Do not remove adhesive strips completely unless your health care provider tells you to do that.  · Check your incision area every day for signs of infection. Check for:  ? Redness, swelling, or pain.  ? Fluid or blood.  ? Warmth.  ? Pus or a bad smell.  Activity  · Do not lift anything that is heavier than 10 lb (4.5 kg), or the limit that you are told, until your health care provider says that it is safe.  · Avoid sports and any other activity that could cause a hit to the generator or leads. Ask your health care provider what activities are safe for you and when you may return to your normal activities.  · Follow instructions from your health care provider about exercise and sexual activity restrictions after your procedure.  Electric and magnetic fields  · Tell all health care providers, including your dentist, that you have a defibrillator. They need to know this so they do not give you an MRI scan, which uses strong magnets.  · When using your cell phone, hold it to the ear that is on the opposite side from the defibrillator. Do not leave your cell phone in a pocket over the defibrillator.  · If you must pass through a metal detector, quickly walk through it. Do not stop under the detector, and do not stand near it.  · Avoid places or objects that have a strong electric or magnetic field, including:  ? Airport security gutierrez. At the airport, tell officials that you have a defibrillator. Your defibrillator ID card will let you be checked in a way that is safe for you and will not damage your defibrillator. Also, do not let a security person wave a magnetic wand near your defibrillator. That can make it stop working.  ? Power plants.  ? Large electrical generators.  ? Anti-theft systems or electronic article surveillance (EAS).  ? Radiofrequency transmission towers, such as cell phone and radio towers.  · Do not use amateur (ham) radio equipment or  electric (arc) welding torches.  · Some devices are safe to use if they are held 12 inches (30 cm) or more away from your defibrillator. These include power tools, lawn mowers, and speakers. If you are not sure if something is safe to use, ask your health care provider.  · Do not use MP3 player headphones. They have magnets.  · You may safely use electric blankets, heating pads, computers, and microwave ovens.  General instructions  · Do not take baths, swim, shower, or use a hot tub until your health care provider approves. You may need to take sponge baths until your health care provider says that you may bathe or shower.  · Do not drive until your health care provider approves.  · Always keep your defibrillator ID card with you. The card should list the implant date, device model, and . Consider wearing a medical alert bracelet or necklace that says that you have an S-ICD.  · Do not use any products that contain nicotine or tobacco, such as cigarettes and e-cigarettes. If you need help quitting, ask your health care provider.  · Have your defibrillator checked as often as told by your health care provider. Most S-ICDs last for 4-8 years before they need to be replaced.  Contact a health care provider if:  · You feel one shock in your chest.  · You gain weight suddenly.  · You have a fever.  · You have severe pain, and medicines do not help.  · You have redness, swelling, or pain around your incision area.  · You have pus or a bad smell coming from your incision area.  · You have fluid or blood coming from your incision.  · Your incision area feels warm to the touch.  · Your heart feels like it is fluttering or skipping beats (heart palpitations).  · You feel increased anxiety or depression.  Get help right away if:  · You feel more than one shock.  · You have chest pain.  · You have problems breathing or have shortness of breath.  · You have dizziness or fainting.  Summary  · After the procedure, you  may have some pain, see a bump under your skin, and feel the device under your skin.  · Check your incision area every day for signs of infection.  · Be careful around electric and magnetic fields.  · Always keep your defibrillator ID card with you.      DISCHARGE INSTRUCTIONS    WOUND CARE  1. No showers for one week.  2. No lifting over five pounds for six weeks.  3. Do not raise affected arm above shoulder level for six weeks.  4. Avoid excessive pushing, pulling, or twisting for six weeks.  5. Do not drive until you're cleared by your physician.  6. Call your doctor's office if you notice any increased swelling, redness or any drainage at the implant site.   7. Call your doctor's office if you develop a fever.    FOLLOW UP  1. 7-10 days after implant your doctor's office will schedule a visit for you with a nurse to check your incision site and get you enrolled in an ICD clinic.   2. Your ICD should be checked every 3 months or as determined by your physician.     IF YOU RECEIVE A SHOCK  1. When you receive your first shock, please call your doctor's office and schedule an appointment in the pacemaker clinic.   2. Once you have been evaluated after the initial shock with the pacemaker nurse, you should notify your doctor if you receive 3 or more shocks in a twenty four hour period.   3. If you experience any near fainting or fainting episodes, please call your doctor's office.  4. You should call 911 if you develop problems with breathing or chest pain.    · FOR DETAILED INFORMATION ON YOUR PARTICULAR ICD, PLEASE READ THE PATIENT GUIDE THAT HAS BEEN PROVIDED TO YOU BY THE COMPANY REPRESENTATIVE.       DIET: To avoid nausea, slowly advance diet as tolerated, avoiding spicy or greasy foods for the first day.  Add more substantial food to your diet according to your physician's instructions.  Babies can be fed formula or breast milk as soon as they are hungry.  INCREASE FLUIDS AND FIBER TO AVOID  CONSTIPATION.    SURGICAL DRESSING/BATHING: Keep dressing clean and dry for 7 days.  May remove dressing on 4/30/21 after 4:00PM.  You do not need to replace the dressing.  Do not submerge area in water (no swimming, tub baths, etc.) for 7 days.      FOLLOW-UP APPOINTMENT:  A follow-up appointment should be arranged with your doctor in 1-2 weeks; call to schedule.    You should CALL YOUR PHYSICIAN if you develop:  Fever greater than 101 degrees F.  Pain not relieved by medication, or persistent nausea or vomiting.  Excessive bleeding (blood soaking through dressing) or unexpected drainage from the wound.  Extreme redness or swelling around the incision site, drainage of pus or foul smelling drainage.  Inability to urinate or empty your bladder within 8 hours.  Problems with breathing or chest pain.    You should call 911 if you develop problems with breathing or chest pain.  If you are unable to contact your doctor or surgical center, you should go to the nearest emergency room or urgent care center.  Physician's telephone #: [email or web address] [phone]    If any questions arise, call your doctor.  If your doctor is not available, please feel free to call the Surgical Center at [phone]. The Contact Center is open Monday through Friday 7AM to 5PM and may speak to a nurse at [phone], or toll free at [phone].     A registered nurse may call you a few days after your surgery to see how you are doing after your procedure.    MEDICATIONS: Resume taking daily medication.  Take prescribed pain medication with food.  If no medication is prescribed, you may take non-aspirin pain medication if needed.  PAIN MEDICATION CAN BE VERY CONSTIPATING.  Take a stool softener or laxative such as senokot, pericolace, or milk of magnesia if needed.    If your physician has prescribed pain medication that includes Acetaminophen (Tylenol), do not take additional Acetaminophen (Tylenol) while taking the prescribed  medication.    Depression / Suicide Risk    As you are discharged from this [facility] facility, it is important to learn how to keep safe from harming yourself.    Recognize the warning signs:  · Abrupt changes in personality, positive or negative- including increase in energy   · Giving away possessions  · Change in eating patterns- significant weight changes-  positive or negative  · Change in sleeping patterns- unable to sleep or sleeping all the time   · Unwillingness or inability to communicate  · Depression  · Unusual sadness, discouragement and loneliness  · Talk of wanting to die  · Neglect of personal appearance   · Rebelliousness- reckless behavior  · Withdrawal from people/activities they love  · Confusion- inability to concentrate     If you or a loved one observes any of these behaviors or has concerns about self-harm, here's what you can do:  · Talk about it- your feelings and reasons for harming yourself  · Remove any means that you might use to hurt yourself (examples: pills, rope, extension cords, firearm)  · Get professional help from the community (Mental Health, Substance Abuse, psychological counseling)  · Do not be alone:Call your Safe Contact- someone whom you trust who will be there for you.  · Call your local CRISIS HOTLINE 997-2166 or 017-262-8570  · Call your local Children's Mobile Crisis Response Team [place] (405) 110-8835 or www.[employer]  · Call the toll free National Suicide Prevention Hotlines   · National Suicide Prevention Lifeline 592-475-WZDW (7565)  National Hope Line Network 800-SUICIDE (450-9939)

## 2021-04-23 NOTE — OP REPORT
Electrophysiology Procedure Note  [facility]      Date of procedure: 4/23/2021     Procedure Performed: Placement of AICD, \Defibrillation testing, moderate sedation administered by RN and supervised by physician    Indication: Chronic systolic heart failure, NYHA II, EF <35%    Physician(s): Brandyn Conroy MD    Anesthesia: Moderate sedation,  start time 1106, stop time 1146  The moderate sedation document has been reviewed, signed and scanned into media     Medications:  6mg Versed, 300mcg Fentanyl  2g Ancef    Specimen(s) Removed: None     Estimated Blood Loss:  30cc    Complications: None    Pre Procedure ECG: SR    Post Procedure ECG: SR    DESCRIPTION OF PROCEDURE: After informed written consent, the patient was brought to the electrophysiology lab in the fasting, unsedated state. The patient was prepped and draped in the usual sterile fashion. The procedure was performed under moderate sedation with local anesthetic. A left infraclavicular incision was made with a scalpel and the pre-pectoral device pocket was created using a combination of blunt dissection and electrocautery. The modified Seldinger technique was used to gain access to the left axillary vein. A peel-away hemostasis sheath was placed in the vein. Under fluoroscopic guidance, the ICD lead was introduced into the heart. The ventricular lead was advanced into the RVOT position the pulled back and advanced to the RV apex; we could not get it as apical as the heart border appeared to extend towards, however this may have been due to LV>RV size which is seen on echo. The lead was tested and had satisfactory sensing and pacing parameters. High output pacing did not produce extracardiac stimulation.  The lead was sutured to the underlying pectoral muscle with interrupted silk over a silastic suture sleeve. The device pocket was irrigated with antibiotic solution, inspected, and no bleeding was seen. The leads were connected to the  ICD pulse generator and the device was inserted into the pocket. The wound was closed with three layers of absorbable sutures and covered with Steri-Strips. After patient was adequately sedated, DFT testing was attempted.  I personally supervised the administration of moderate sedation by the RN and observed the level of consciousness and physiologic status throughout the procedure.  Following recovery from sedation, the patient was transferred to a monitored bed in good condition.    IMPLANTED DEVICE INFORMATION:    Pulse generator is a Med[employer]ane Logic model IQFE1K2   Serial number [id number]      LEAD INFORMATION:  1. Right ventricular lead is a Medtronic model 6935M, serial number [id number], R wave 8.6 millivolts, threshold 0.75 Volts, pacing impedance 475 Ohms.    DEVICE PROGRAMMING:    Amari therapy: VVI 40  Tachy therapy:  VF <300ms FVT >250ms Monitor <500ms    DEFIBRILLATION THRESHOLD TESTING:    DFT was attempted but induction was initially unsuccessful with shock-on-T. Pt was seen to be less than fully asleep after this shock, and further sedation was difficult as he would still be easily arousable but intermittently become apneic; the DFT testing was aborted at this time.    FLUOROSCOPY TIME: 3.2 min    SUMMARY/CONCLUSIONS:  1. Successful single chamber ICD implantation.    RECOMMENDATIONS:  1. Admit to monitored bed.  2. PA and lateral chest x-ray.  3. Implantable cardioverter defibrillator interrogation prior to hospital discharge.  4. PO antibiotics for 5 days  5. Follow-up in device clinic for wound check and device interrogation.

## 2021-04-27 ENCOUNTER — ANTICOAGULATION MONITORING (OUTPATIENT)
Dept: VASCULAR LAB | Facility: MEDICAL CENTER | Age: 36
End: 2021-04-27

## 2021-04-27 DIAGNOSIS — I51.3 INTRACARDIAC THROMBUS: ICD-10-CM

## 2021-04-27 NOTE — PROGRESS NOTES
Anticoagulation Summary  As of 2021    INR goal:  2.0-3.0   TTR:  46.6 % (4.2 mo)   INR used for dosin.42 (2021)   Warfarin maintenance plan:  12.5 mg (5 mg x 2.5) every e, Th; 10 mg (5 mg x 2) all other days   Weekly warfarin total:  75 mg   Plan last modified:  Savanah M Filter (2021)   Next INR check:  2021   Target end date:  Indefinite    Indications    Intracardiac thrombus [I51.3]             Anticoagulation Episode Summary     INR check location:  Anticoagulation Clinic    Preferred lab:      Send INR reminders to:      Comments:        Anticoagulation Care Providers     Provider Role Specialty Phone number    [facility]   [phone]    BENJAMIN Davidson  [facility] [phone]        Anticoagulation Patient Findings      Spoke with pt.  INR is subtherapeutic.   Pt had a pacemaker placement on .  Pt denies any unusual s/s of bleeding, bruising, clotting or any changes to diet or medications. Denies any etoh, cranberries, supplements, or illness.   Pt verifies warfarin weekly dosing. Pt denies holding warfarin for his procedure on .    Will have pt bolus x 2 days     Repeat INR in 2 days.     Guerda Shetty, TerryD

## 2021-04-28 ENCOUNTER — NON-PROVIDER VISIT (OUTPATIENT)
Dept: CARDIOLOGY | Facility: PHYSICIAN GROUP | Age: 36
End: 2021-04-28
Payer: MEDICAID

## 2021-04-28 ENCOUNTER — TELEPHONE (OUTPATIENT)
Dept: VASCULAR LAB | Facility: MEDICAL CENTER | Age: 36
End: 2021-04-28

## 2021-04-28 VITALS
DIASTOLIC BLOOD PRESSURE: 70 MMHG | SYSTOLIC BLOOD PRESSURE: 106 MMHG | OXYGEN SATURATION: 95 % | WEIGHT: 290 LBS | HEART RATE: 110 BPM | HEIGHT: 72 IN | BODY MASS INDEX: 39.28 KG/M2

## 2021-04-28 DIAGNOSIS — I42.7 DRUG-INDUCED CARDIOMYOPATHY (HCC): ICD-10-CM

## 2021-04-28 DIAGNOSIS — Z95.810 AICD (AUTOMATIC CARDIOVERTER/DEFIBRILLATOR) PRESENT: ICD-10-CM

## 2021-04-28 PROBLEM — Z09 HOSPITAL DISCHARGE FOLLOW-UP: Status: RESOLVED | Noted: 2020-05-28 | Resolved: 2021-04-28

## 2021-04-28 PROBLEM — R11.0 NAUSEA: Status: RESOLVED | Noted: 2020-11-18 | Resolved: 2021-04-28

## 2021-04-28 PROBLEM — K81.0 ACUTE CHOLECYSTITIS: Status: RESOLVED | Noted: 2020-08-02 | Resolved: 2021-04-28

## 2021-04-28 PROCEDURE — 93282 PRGRMG EVAL IMPLANTABLE DFB: CPT | Performed by: NURSE PRACTITIONER

## 2021-04-28 ASSESSMENT — FIBROSIS 4 INDEX: FIB4 SCORE: 0.95

## 2021-04-28 NOTE — TELEPHONE ENCOUNTER
Spoke with David, who will remain anticoagulated on warfarin until he follow up with his cardiologist, MD Savanah Cabrera, Clinical Pharmacist, JACK, ZOLTANP

## 2021-04-28 NOTE — PROGRESS NOTES
Patient had AICD implanted on 4/23/2021 by Dr. Conroy. He is here today for device interrogation and wound check.    Device is working normally. Presenting rate is 100-110bpm today. No device therapy.  Normal sensing and capture of RV lead; stable impedances. Battery longevity is 13.5 years.  No changes are made today.    Incision is well healed with no redness, swelling or drainage noted. He is reminded about limited ROM of left arm; he is also given wound care instructions, general device information and anticipatory guidance.    FU in 1 months for next AICD check with me.

## 2021-05-17 ENCOUNTER — APPOINTMENT (OUTPATIENT)
Dept: CARDIOLOGY | Facility: CLINIC | Age: 36
End: 2021-05-17
Payer: MEDICAID

## 2021-05-18 ENCOUNTER — ANTICOAGULATION MONITORING (OUTPATIENT)
Dept: MEDICAL GROUP | Facility: PHYSICIAN GROUP | Age: 36
End: 2021-05-18

## 2021-05-18 DIAGNOSIS — I51.3 INTRACARDIAC THROMBUS: ICD-10-CM

## 2021-05-18 NOTE — PROGRESS NOTES
Anticoagulation Summary  As of 2021    INR goal:  2.0-3.0   TTR:  38.9 % (5 mo)   INR used for dosin.03 (2021)   Warfarin maintenance plan:  12.5 mg (5 mg x 2.5) every Tue, Thu; 10 mg (5 mg x 2) all other days   Weekly warfarin total:  75 mg   Plan last modified:  Savanah M Filter (2021)   Next INR check:  2021   Target end date:  Indefinite    Indications    Intracardiac thrombus [I51.3]             Anticoagulation Episode Summary     INR check location:  Anticoagulation Clinic    Preferred lab:      Send INR reminders to:      Comments:        Anticoagulation Care Providers     Provider Role Specialty Phone number    [facility]   [phone]    BENJAMIN Davidson  [facility] [phone]        Anticoagulation Patient Findings      Spoke with pt via phone.  INR is SUB therapeutic at 1.03  Pt denies any unusual s/s of bleeding, bruising, clotting or any changes to diet or medications. Denies any etoh, cranberries, supplements, or illness.     Last INR was 1.42 on 21 - pt was to bolus dose and repeat INR in 2 days.  Pt reports he has been taking a significantly higher than scheduled dose of warfarin as he is alternating days of 15mg and 20mg - 110mg/week  - Dosing calendar currently at 75mg/week (63% difference)      Discussed Lovenox to bridge, pt has Lovenox 120mg syringes at his house   - will have pt use Lovenox 120mg every 12 hours until INR is therapeutic  Pt reports missed at least one dose last week ~4 days ago.   - Unclear of current reported history.    Will have pt continue current regimen of 20mg today, 15mg tomorrow, then repeat INR.     Repeat INR in 2 days    Advised patient to seek emergency services if experiencing any signs/symptoms of thrombosis or bleeding.        Juan Manuel Garner, PharmD

## 2021-05-21 ENCOUNTER — ANTICOAGULATION MONITORING (OUTPATIENT)
Dept: VASCULAR LAB | Facility: MEDICAL CENTER | Age: 36
End: 2021-05-21

## 2021-05-21 DIAGNOSIS — I51.3 INTRACARDIAC THROMBUS: ICD-10-CM

## 2021-05-21 NOTE — PROGRESS NOTES
Anticoagulation Summary  As of 2021    INR goal:  2.0-3.0   TTR:  38.7 % (5.1 mo)   INR used for dosin.40 (2021)   Warfarin maintenance plan:  12.5 mg (5 mg x 2.5) every Tue, Thu; 10 mg (5 mg x 2) all other days   Weekly warfarin total:  75 mg   Plan last modified:  Savanah M Filter (2021)   Next INR check:  2021   Target end date:  Indefinite    Indications    Intracardiac thrombus [I51.3]             Anticoagulation Episode Summary     INR check location:  Anticoagulation Clinic    Preferred lab:      Send INR reminders to:      Comments:        Anticoagulation Care Providers     Provider Role Specialty Phone number    [facility]   [phone]    BENJAMIN Davidson  [facility] [phone]        Anticoagulation Patient Findings      Spoke with patient to report a therapeutic INR.      Pt denies any s/s of bleeding, bruising, clotting or any changes to diet or medication.  Pt admits to taking 20mg warfarin on TUE and THURS this week, and 25mg on WED and FRI.  Expressed the importance of compliance to patient.    Pt instructed to take 15mg on SAT and SUN. Instructed to stop Lovenox.    Will follow up in 3 days to ensure INR is not continuing to quickly trend upward.     Thais Ibarra, Pharmacy Intern

## 2021-05-27 ENCOUNTER — ANTICOAGULATION MONITORING (OUTPATIENT)
Dept: MEDICAL GROUP | Facility: MEDICAL CENTER | Age: 36
End: 2021-05-27

## 2021-05-27 DIAGNOSIS — I51.3 INTRACARDIAC THROMBUS: ICD-10-CM

## 2021-05-28 NOTE — PROGRESS NOTES
OP Telephone Anticoagulation Service Note    Date: 2021      Anticoagulation Summary  As of 2021    INR goal:  2.0-3.0   TTR:  41.0 % (5.3 mo)   INR used for dosin.24 (2021)   Warfarin maintenance plan:  12.5 mg (5 mg x 2.5) every Tue, Thu; 10 mg (5 mg x 2) all other days   Weekly warfarin total:  75 mg   Plan last modified:  Savanah FERRELL Filter (2021)   Next INR check:     Target end date:  Indefinite    Indications    Intracardiac thrombus [I51.3]             Anticoagulation Episode Summary     INR check location:  Anticoagulation Clinic    Preferred lab:      Send INR reminders to:      Comments:        Anticoagulation Care Providers     Provider Role Specialty Phone number    [facility]   [phone]    BENJAMIN Davidson  Family Medicine [phone]        Anticoagulation Patient Findings        Plan: .Spoke with patient on the phone. Patient is  therapeutic today. Confirmed dosing. No missed tablets in the last week. Patient denies any changes in medications or diet. Patient denies any signs or symptoms of bleeding or clotting. Instructed patient to call clinic if any unusual bleeding or bruising occurs. Will continue dosing as outlined. Will follow-up with patient in 1 week(s).        Dena Rangel, TerryD

## 2021-06-02 ENCOUNTER — NON-PROVIDER VISIT (OUTPATIENT)
Dept: CARDIOLOGY | Facility: PHYSICIAN GROUP | Age: 36
End: 2021-06-02
Payer: MEDICAID

## 2021-06-02 VITALS
HEART RATE: 113 BPM | BODY MASS INDEX: 38.47 KG/M2 | SYSTOLIC BLOOD PRESSURE: 132 MMHG | HEIGHT: 72 IN | DIASTOLIC BLOOD PRESSURE: 78 MMHG | OXYGEN SATURATION: 94 % | WEIGHT: 284 LBS

## 2021-06-02 DIAGNOSIS — I50.9 CONGESTIVE HEART FAILURE, UNSPECIFIED HF CHRONICITY, UNSPECIFIED HEART FAILURE TYPE (HCC): ICD-10-CM

## 2021-06-02 DIAGNOSIS — I26.99 PULMONARY EMBOLISM, OTHER, UNSPECIFIED CHRONICITY, UNSPECIFIED WHETHER ACUTE COR PULMONALE PRESENT (HCC): ICD-10-CM

## 2021-06-02 DIAGNOSIS — Z79.01 ANTICOAGULATED: ICD-10-CM

## 2021-06-02 DIAGNOSIS — I42.7 DRUG-INDUCED CARDIOMYOPATHY (HCC): ICD-10-CM

## 2021-06-02 DIAGNOSIS — Z95.810 AICD (AUTOMATIC CARDIOVERTER/DEFIBRILLATOR) PRESENT: ICD-10-CM

## 2021-06-02 PROCEDURE — 93282 PRGRMG EVAL IMPLANTABLE DFB: CPT | Performed by: NURSE PRACTITIONER

## 2021-06-02 ASSESSMENT — FIBROSIS 4 INDEX: FIB4 SCORE: 0.95

## 2021-06-02 NOTE — PROGRESS NOTES
Patient had AICD implanted on 4/23/2021 by Dr. Conroy. He is here today for final threshold checks. He is feeling good.    Device is working normally.  No device therapy.  Normal sensing and capture of RV lead; stable impedances. Battery longevity is 13.3 years.      Change today include decreasing RV output to 2.5 volts.    He does see Dr. rGace via Virtual Visit next month (7/2/2021), and should keep this.    FU in 6 months for next AICD check with me.

## 2021-06-10 ENCOUNTER — ANTICOAGULATION MONITORING (OUTPATIENT)
Dept: VASCULAR LAB | Facility: MEDICAL CENTER | Age: 36
End: 2021-06-10

## 2021-06-10 DIAGNOSIS — I51.3 INTRACARDIAC THROMBUS: ICD-10-CM

## 2021-06-10 NOTE — PROGRESS NOTES
Anticoagulation Summary  As of 6/10/2021    INR goal:  2.0-3.0   TTR:  44.2 % (5.8 mo)   INR used for dosin.95 (2021)   Warfarin maintenance plan:  12.5 mg (5 mg x 2.5) every Tue, Thu, Sat; 10 mg (5 mg x 2) all other days   Weekly warfarin total:  77.5 mg   Plan last modified:  Guerda Shetty PharmD (6/10/2021)   Next INR check:  2021   Target end date:  Indefinite    Indications    Intracardiac thrombus [I51.3]             Anticoagulation Episode Summary     INR check location:  Anticoagulation Clinic    Preferred lab:      Send INR reminders to:      Comments:        Anticoagulation Care Providers     Provider Role Specialty Phone number    [facility]   [phone]    BENJAMIN Davidson  [facility] [phone]        Anticoagulation Patient Findings      Left voicemail message to report a subtherapeutic INR of 1.95.      Will have pt increase weekly regimen by 4% to ensure INR stays in therapeutic range.    Requested pt contact the clinic for any s/s of unusual bleeding, bruising, clotting or any changes to diet or medication.    FU INR in 2 week(s).    Guerda Shetty, PharmD

## 2021-07-14 ENCOUNTER — TELEPHONE (OUTPATIENT)
Dept: VASCULAR LAB | Facility: MEDICAL CENTER | Age: 36
End: 2021-07-14

## 2021-07-16 ENCOUNTER — ANTICOAGULATION MONITORING (OUTPATIENT)
Dept: MEDICAL GROUP | Facility: PHYSICIAN GROUP | Age: 36
End: 2021-07-16

## 2021-07-16 DIAGNOSIS — I51.3 INTRACARDIAC THROMBUS: ICD-10-CM

## 2021-07-16 NOTE — PROGRESS NOTES
OP Telephone Anticoagulation Service Note    Date: 2021      Anticoagulation Summary  As of 2021    INR goal:  2.0-3.0   TTR:  36.4 % (7 mo)   INR used for dosin.81 (2021)   Warfarin maintenance plan:  12.5 mg (5 mg x 2.5) every Tue, Thu, Sat; 10 mg (5 mg x 2) all other days   Weekly warfarin total:  77.5 mg   Plan last modified:  Guerda Shetty PharmD (6/10/2021)   Next INR check:  2021   Target end date:  Indefinite    Indications    Intracardiac thrombus [I51.3]             Anticoagulation Episode Summary     INR check location:  Anticoagulation Clinic    Preferred lab:      Send INR reminders to:      Comments:        Anticoagulation Care Providers     Provider Role Specialty Phone number    [facility]   [phone]    BENJAMIN Davidson  [facility] [phone]        Anticoagulation Patient Findings      INR SUB-therapeutic at 1.81.  Spoke w/ pt on phone.  Verified regimen w/ pt.  Instructed pt to bolus x 1 dose w/ 12.5 mg and to then continue on w/ his current regimen.  NO s/s bleeding reported per pt.  Pt eating more greens than normal.  NO changes in medications reported per pt.  Check INR in 1 week(s).  Instructed pt to call clinic at [phone] if there are any questions.  Pt stated understanding.    Milton Crooks, TerryD

## 2021-07-23 ENCOUNTER — PATIENT MESSAGE (OUTPATIENT)
Dept: CARDIOLOGY | Facility: MEDICAL CENTER | Age: 36
End: 2021-07-23

## 2021-07-23 ENCOUNTER — TELEPHONE (OUTPATIENT)
Dept: CARDIOLOGY | Facility: MEDICAL CENTER | Age: 36
End: 2021-07-23

## 2021-07-23 NOTE — TELEPHONE ENCOUNTER
FYI:  Remote Transmission 7/23/2021:    1-SVT episode (154 bpm) 7/16/2021@1:46pm lasting 21 secs.    Full report scanned into media.

## 2021-07-24 NOTE — TELEPHONE ENCOUNTER
Attempted to call pt, unable to reach.  Left voicemail to return this call at their earliest convenience.    [employer] message sent to pt regarding findings, awaiting response.

## 2021-07-26 NOTE — TELEPHONE ENCOUNTER
He was seen in the ER ([facility]) in late June/early July for bronchitis, and was treated with steroids - this could have precipitated this event (but it wasn't terribly long at 20+ seconds).    Please find out if he's having any palpitations/racing of his heart; if not, I'm not terribly concerned, but if he is having symptoms, he needs to let us know.    Also, please encourage sobriety!  Thanks, AB

## 2021-07-27 ENCOUNTER — ANTICOAGULATION MONITORING (OUTPATIENT)
Dept: VASCULAR LAB | Facility: MEDICAL CENTER | Age: 36
End: 2021-07-27

## 2021-07-27 DIAGNOSIS — I51.3 INTRACARDIAC THROMBUS: ICD-10-CM

## 2021-07-27 NOTE — PROGRESS NOTES
Anticoagulation Summary  As of 2021    INR goal:  2.0-3.0   TTR:  37.0 % (7.4 mo)   INR used for dosin.18 (2021)   Warfarin maintenance plan:  12.5 mg (5 mg x 2.5) every Tue, Thu, Sat; 10 mg (5 mg x 2) all other days   Weekly warfarin total:  77.5 mg   Plan last modified:  Guerda Shetty PharmD (6/10/2021)   Next INR check:  2021   Target end date:  Indefinite    Indications    Intracardiac thrombus [I51.3]             Anticoagulation Episode Summary     INR check location:  Anticoagulation Clinic    Preferred lab:      Send INR reminders to:      Comments:        Anticoagulation Care Providers     Provider Role Specialty Phone number    [facility]   [phone]    BENJAMIN Davidson  Family [facility] [phone]        Anticoagulation Patient Findings      Spoke with patient to report a therapeutic INR.      Pt denies any s/s of bleeding, bruising, clotting or any changes to diet or medication.      Pt instructed to continue with current warfarin dosing regimen, confirms dosing.   Will follow up in 3 week(s).     Leonela Gant, TerryD

## 2021-08-12 ENCOUNTER — ANTICOAGULATION MONITORING (OUTPATIENT)
Dept: VASCULAR LAB | Facility: MEDICAL CENTER | Age: 36
End: 2021-08-12

## 2021-08-12 DIAGNOSIS — I51.3 INTRACARDIAC THROMBUS: ICD-10-CM

## 2021-08-13 NOTE — PROGRESS NOTES
Anticoagulation Summary  As of 8/12/2021    INR goal:  2.0-3.0   TTR:  38.6 % (7.9 mo)   INR used for dosing:  3.70 (8/12/2021)   Warfarin maintenance plan:  12.5 mg (5 mg x 2.5) every Tue, Thu, Sat; 10 mg (5 mg x 2) all other days   Weekly warfarin total:  77.5 mg   Plan last modified:  Guerda Shetty PharmD (6/10/2021)   Next INR check:  8/19/2021   Target end date:  Indefinite    Indications    Intracardiac thrombus [I51.3]             Anticoagulation Episode Summary     INR check location:  Anticoagulation Clinic    Preferred lab:      Send INR reminders to:      Comments:        Anticoagulation Care Providers     Provider Role Specialty Phone number    [facility]   [phone]    BENJAMIN Davidson  [facility] [phone]        Anticoagulation Patient Findings      Left voicemail message to report a supratherapeutic INR of 3.7.    Will have pt take hold dose of warfarin today of 12.5 mg and then pt to continue with current warfarin dosing regimen. Requested pt contact the clinic for any s/s of unusual bleeding, bruising, clotting or any changes to diet or medication.    FU INR in 1 week(s).    Guerda Shetty PharmD

## 2021-08-23 ENCOUNTER — ANTICOAGULATION MONITORING (OUTPATIENT)
Dept: MEDICAL GROUP | Facility: PHYSICIAN GROUP | Age: 36
End: 2021-08-23

## 2021-08-23 DIAGNOSIS — I51.3 INTRACARDIAC THROMBUS: ICD-10-CM

## 2021-08-24 NOTE — PROGRESS NOTES
Anticoagulation Summary  As of 2021    INR goal:  2.0-3.0   TTR:  38.7 % (8.2 mo)   INR used for dosin.31 (2021)   Warfarin maintenance plan:  12.5 mg (5 mg x 2.5) every Tue, Thu, Sat; 10 mg (5 mg x 2) all other days   Weekly warfarin total:  77.5 mg   Plan last modified:  Guerda Shetty PharmD (2021)   Next INR check:  2021   Target end date:  Indefinite    Indications    Intracardiac thrombus [I51.3]             Anticoagulation Episode Summary     INR check location:  Anticoagulation Clinic    Preferred lab:      Send INR reminders to:      Comments:        Anticoagulation Care Providers     Provider Role Specialty Phone number    [facility]   [phone]    BENJAMIN Davidson  Family [facility] [phone]        Anticoagulation Patient Findings      Left voicemail message to report a subtherapeutic INR of 1.31  Unsure it pt held extra doses   Pt to continue with current warfarin dosing regimen. Requested pt contact the clinic for any s/s of unusual bleeding, bruising, clotting or any changes to diet or medication.    FU INR in 1 week(s).    Guerda Shetty, PharmD

## 2021-09-15 ENCOUNTER — TELEPHONE (OUTPATIENT)
Dept: VASCULAR LAB | Facility: MEDICAL CENTER | Age: 36
End: 2021-09-15

## 2021-09-16 ENCOUNTER — ANTICOAGULATION MONITORING (OUTPATIENT)
Dept: VASCULAR LAB | Facility: MEDICAL CENTER | Age: 36
End: 2021-09-16

## 2021-09-16 DIAGNOSIS — I51.3 INTRACARDIAC THROMBUS: ICD-10-CM

## 2021-09-16 NOTE — PROGRESS NOTES
OP Telephone Anticoagulation Service Note    Date: 2021      Anticoagulation Summary  As of 2021    INR goal:  2.0-3.0   TTR:  35.1 % (9 mo)   INR used for dosin.13 (9/15/2021)   Warfarin maintenance plan:  15 mg (5 mg x 3) every Tue, Thu, Sat; 10 mg (5 mg x 2) all other days   Weekly warfarin total:  85 mg   Plan last modified:  Noemi Yin, Pharmacy Intern (2021)   Next INR check:  2021   Target end date:  Indefinite    Indications    Intracardiac thrombus [I51.3]             Anticoagulation Episode Summary     INR check location:  Anticoagulation Clinic    Preferred lab:      Send INR reminders to:      Comments:        Anticoagulation Care Providers     Provider Role Specialty Phone number    [facility]   [phone]    LEROY Davidson.  [facility] [phone]        Anticoagulation Patient Findings      INR subtherapeutic at 1.13.  Left voicemail message for pt - unable to verify current regimen.  Instructed pt to continue bolus with warfarin 20 mg x2 days then continue current warfarin regimen and bridge with Lovenox.  Told pt to call if any s/s of bleeding or medication changes.  Check INR in 5 days(s).  Instructed pt to call clinic at [phone] if there are any questions.    Noemi Yin, Pharmacy Intern.  Discussed with Kendra Croft.

## 2021-09-27 ENCOUNTER — TELEPHONE (OUTPATIENT)
Dept: SCHEDULING | Facility: IMAGING CENTER | Age: 36
End: 2021-09-27

## 2021-09-27 NOTE — TELEPHONE ENCOUNTER
TAYLER Lambert,    This patients mother Pattie called and stated that David has been swelling up and coughing to the point of vomiting. She would like a call back to see what she would be able to give to him to help assist with these symptoms. He was also in the [facility] ER on 09/22 for similar issues. Please call back at [phone].      Thank you,  LENNY

## 2021-10-05 ENCOUNTER — TELEPHONE (OUTPATIENT)
Dept: VASCULAR LAB | Facility: MEDICAL CENTER | Age: 36
End: 2021-10-05

## 2021-10-31 NOTE — PROGRESS NOTES
"Subjective:   Chief Complaint:   Chief Complaint   Patient presents with   • Congestive Heart Failure     & Drug-induced cardiomyopathy      This evaluation was conducted via Zoom using secure and encrypted videoconferencing technology. The patient was in a private location in the [facility].    The patient's identity was confirmed and verbal consent was obtained for this virtual visit.    David Rod is a 36 y.o. male who returns for dilated CMO, BiV failure, NSTEMI, prior PE, possible LV thrombus, anticoagulation, HTN, ICD.    My first visit with him early Nov 2020, he was in [facility] 4 CHF.  We changed meds, he lost 40-45 pounds in 2 weeks.  Weight was as high as 260, was previously 220 leaving the hospital, got down to 215, now 220-225 but he thinks healthy weight.  Weight up to 299 in ED, has been to the ED, once they gave him fluids.  Has had N/V also.  Troponin normal.  Noting UGARTE, LE swelling and sleeping in a chair.  Has been over the past few weeks.    Prior LE edema caused wounds, did have wound and needed wound care but now healed up.  Was in a W/C, when I first met him.  Now [facility] 3, only 20 steps now.  Also some mild CP with UGARTE, resolves at rest.  Had been on torsemide and jada and daily metolazone though it was intended PRN.    Has MI in Feb due to drug use, has dilated CMO, EF < 20%, biV failure  NSTEMI was not Type I MI.  Was told \"blood clot in my lung.\" Later coughing up blood.  On warfarin.  No LV thrombus on echo 9-2020 or 3-2021.  EF up to 30-35%.  Has dilated LV.    Has primary prevention ICD 2021, no shocks.    He was feeling dizzy in July, stopped low EF meds.    Passive liver congestion.    Has HTN, no battery in his cuff at home.    No stroke/TIA like symptoms.    No palpitations.    Has a venous clot removed from arm after some kind of line.  US UE 10-4-2020 Occlusive thrombus within the right internal jugular vein, nonocclusive thrombus within the right median cubital vein.    Has " HLP, LDL high, TG high, HDL low, no meds yet.  Father's  had HLP.    Father had MI/stents in his 50s,  at 72.  MGF  of chf in hs 80s.  No prior smoking history.  No history of diabetes.  No history of autoimmune disease such as lupus or rheumatoid arthritis.  No chronic kidney disease.  No ETOH overuse. Prior heavy etoh, sober 7 years.  No caffeine overuse. Soda.  No more recreation substance use. Sober from prior cocaine use.    Here with his mother, his support.  Living in [place] at present.    DATA REVIEWED by me:  ECG (my personal interpretation) [id number]  Sinus, 91, LPFB, delayed R wave progression, T wave inversion    ICD check 21  Decreased RV output, no shocks.    IMPLANTED DEVICE INFORMATION:  21 Dr. Conroy  Pulse generator is a TrackaPhone model [id number]   Serial number [id number]      LEAD INFORMATION:  1. Right ventricular lead is a Medtronic model 6935M, serial number [id number], R wave 8.6 millivolts, threshold 0.75 Volts, pacing impedance 475 Ohms.    Echo 3-22-21  Left ventricle is severely dilated, 7 cm.  Moderately reduced left ventricular systolic function.  Left ventricular ejection fraction is visually estimated to be 30-35%.  Moderate global hypokinesis with abnormal septal motion related to   conduction abnormality.  Mild to moderate mitral regurgitation.  Unable to estimate pulmonary artery pressure, normal estimated right   atrial pressure.     No prior study is available for comparison.     [facility]  Severely dilated LV <20%, RV moderately dilated, severe LAE, mild to mod TR, mod MR, RVSP 70, RAP 20, left pleural effusion, small dimitry effusion, flat septum.    RUQ US 6-3-2020  Cholelithiasis is present.  The pancreas is not well seen.  The liver appears enlarged.  The portal vein is not reliably imaged.    US UE 10-4-2020  Occlusive thrombus within the right internal jugular vein  Nonocclusive thrombus within the right median cubital vein.    Most  recent labs:       Lab Results   Component Value Date/Time    WBC 10.0 10/29/2021 11:00 AM    HEMOGLOBIN 12.8 (L) 10/29/2021 11:00 AM    HEMATOCRIT 39.6 (L) 10/29/2021 11:00 AM    MCV 87.0 10/29/2021 11:00 AM    INR 1.22 10/29/2021 11:00 AM      Lab Results   Component Value Date/Time    SODIUM 133 (L) 10/29/2021 11:00 AM    POTASSIUM 3.1 (L) 10/29/2021 11:00 AM    CHLORIDE 98 10/29/2021 11:00 AM    CO2 23 10/29/2021 11:00 AM    GLUCOSE 131 (H) 10/29/2021 11:00 AM    BUN 17 10/29/2021 11:00 AM    CREATININE 1.4 (H) 10/29/2021 11:00 AM      Lab Results   Component Value Date/Time    ASTSGOT 28 10/29/2021 11:00 AM    ALTSGPT 23 10/29/2021 11:00 AM    ALBUMIN 3.4 10/29/2021 11:00 AM      Lab Results   Component Value Date/Time    CHOLSTRLTOT 248 (H) 12/17/2020 08:55 AM     (H) 12/17/2020 08:55 AM    HDL 33.0 (L) 12/17/2020 08:55 AM    TRIGLYCERIDE 211 (H) 12/17/2020 08:55 AM     No results for input(s): NTPROBNP, TROPONINT in the last 72 hours.      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 Moderate global hypokinesis. Mildly dilated RV. Normal RA and LA. Mild TR. Ascending aorta 3.5cm. May 2020: Hospital for acute CHF. LVEF <15%.   • Ear pain    • GERD (gastroesophageal reflux disease)    • Hypertension    • Insomnia    • Myocardial infarct (HCC) 2020    twice    • Pulmonary embolism and infarction (HCC)     arm and lung, taking warfarin   • Systolic heart failure (HCC)      Past Surgical History:   Procedure Laterality Date   • AICD IMPLANT Left 04/23/2021    Medtronic Crome VR [id number] implanted by Dr. Conroy.     Family History   Problem Relation Age of Onset   • Heart Disease Father    • Heart Attack Father         Stents in 50s     Social History     Socioeconomic History   • Marital status: Single     Spouse name: Not on file   • Number of children: Not  on file   • Years of education: Not on file   • Highest education level: Not on file   Occupational History   • Not on file   Tobacco Use   • Smoking status: Never Smoker   • Smokeless tobacco: Never Used   Vaping Use   • Vaping Use: Never used   Substance and Sexual Activity   • Alcohol use: Not Currently   • Drug use: Not Currently     Comment: Hx of daily cocaine use   • Sexual activity: Not Currently   Other Topics Concern   • Not on file   Social History Narrative   • Not on file     Social Determinants of Health     Financial Resource Strain:    • Difficulty of Paying Living Expenses:    Food Insecurity:    • Worried About Running Out of Food in the Last Year:    • Ran Out of Food in the Last Year:    Transportation Needs:    • Lack of Transportation (Medical):    • Lack of Transportation (Non-Medical):    Physical Activity:    • Days of Exercise per Week:    • Minutes of Exercise per Session:    Stress:    • Feeling of Stress :    Social Connections:    • Frequency of Communication with Friends and Family:    • Frequency of Social Gatherings with Friends and Family:    • Attends Sabianism Services:    • Active Member of Clubs or Organizations:    • Attends Club or Organization Meetings:    • Marital Status:    Intimate Partner Violence:    • Fear of Current or Ex-Partner:    • Emotionally Abused:    • Physically Abused:    • Sexually Abused:      No Known Allergies    Current Outpatient Medications   Medication Sig Dispense Refill   • spironolactone (ALDACTONE) 25 MG Tab Take 1 Tablet by mouth every day. 90 Tablet 7   • losartan (COZAAR) 25 MG Tab Take 0.5 Tablets by mouth every day. 45 Tablet 7   • torsemide (DEMADEX) 20 MG Tab Take 4 Tablets by mouth every day. 360 Tablet 7   • ondansetron (ZOFRAN ODT) 4 MG TABLET DISPERSIBLE Take 1 Tablet by mouth every 6 hours as needed for Nausea for up to 10 days. 10 Tablet 0   • promethazine (PHENERGAN) 25 MG Suppos Insert 1 Suppository into the rectum every 6 hours as  needed for Nausea/Vomiting. 10 Suppository 0   • warfarin (COUMADIN) 5 MG Tab take 2 to 2 and 1/2 tablet by mouth daily as directed 225 Tablet 0   • acetaminophen (TYLENOL) 500 MG Tab Take 1,000 mg by mouth every 6 hours as needed.       No current facility-administered medications for this visit.       ROS    All others systems reviewed and negative.     Objective:     Ht 1.829 m (6')   Wt (!) 136 kg (299 lb)  Body mass index is 40.55 kg/m².    Vitals obtained by patient:  Reports /80.    Physical Exam:  General: No acute distress. Well nourished.   HEENT: EOM grossly intact, no scleral icterus, no pharyngeal erythema.  Phonation normal.  Neck:  No JVD noted at 90 degrees, trachea midline, no obvious masses, moves freely without pain.  CVS: Pulse as reported by patient, Trace LE edema.  Resp: Unlabored respiratory effort, no cough or audible wheeze  MSK/Ext: No clubbing or cyanosis visible appreciated.  Skin: No rashes in visible areas.  Neurological: CN III-XII grossly intact. No focal deficits.   Psych: A&O x 3, appropriate affect, good judgement, well groomed        Assessment:     1. Acute on chronic HFrEF (heart failure with reduced ejection fraction) (HCC)  Basic Metabolic Panel    Basic Metabolic Panel   2. AICD (automatic cardioverter/defibrillator) present     3. Drug-induced cardiomyopathy (HCC)     4. Anticoagulated     5. Pulmonary embolism, other, unspecified chronicity, unspecified whether acute cor pulmonale present (HCC)     6. Intracardiac thrombus     7. H/O non-ST elevation myocardial infarction (NSTEMI)     8. Other chest pain     9. Family history of coronary artery disease in father     10. Palpitations     11. Other secondary pulmonary hypertension (HCC)     12. Tricuspid valve regurgitation, nonrheumatic     13. Hx of non-ST elevation myocardial infarction (NSTEMI)     14. Non-rheumatic mitral regurgitation     15. Pericardial effusion     16. Mixed hyperlipidemia     17. History of  cocaine abuse (HCC)         Medical Decision Making:  Today's Assessment / Status / Plan:     -Clinically in acute CHF, needs close FU, med adjustments  -Prior Dry weight appears was 220-225, he is up to 300  -Stopped low EF meds due to side effects, needs to resume, see below.  -Atypical CP, normal recent troponin  -Stay on warfarin, sees vascular clinic  -Has strong FH CAD and abnormal cholesterol but still young, will address in 40s. Prior MI related to drug use.  -CP probably related to CHF   -keep an eye on blood pressure make sure does not come tomorrow  -Nausea and vomiting related to CHF most likely, giving fluids is not the right answer  -RTC 2 weeks with sandhya RECINOS MD 2 months      Written instructions given today:      -Increase torsemide 20 mgfrom 2 tablets daily to 4 tablets daily.    -Resume spironolactone 25 mg once daily.    -Losartan 12.5 mg once daily.    -Nonfasting blood work in 5 days, and 15 days.    -We need to keep an eye on your kidney function and potassium.    -You should lose 10 pounds of water weight over the next 2 weeks.    -Virtual visit with a nurse practitioner in 2 weeks.    -Eventually we need to get you back on the carvedilol as well because it is a powerful heart medicine.  If you are having side effects in the future, let us know so we can make medication changes.            Return in about 2 weeks (around 11/15/2021).    It is my pleasure to participate in the care of Mr. Rod.  Please do not hesitate to contact me with questions or concerns.    Karli Grace MD, M[facility] with [facility]  Cardiologist [facility]    Please note that this dictation was created using voice recognition software. I have made every reasonable attempt to correct obvious errors, but it is possible there are errors of grammar and possibly content that I did not discover before finalizing the note.

## 2021-11-01 ENCOUNTER — TELEMEDICINE (OUTPATIENT)
Dept: CARDIOLOGY | Facility: CLINIC | Age: 36
End: 2021-11-01
Payer: MEDICAID

## 2021-11-01 VITALS — WEIGHT: 299 LBS | BODY MASS INDEX: 40.5 KG/M2 | HEIGHT: 72 IN

## 2021-11-01 DIAGNOSIS — I51.3 INTRACARDIAC THROMBUS: ICD-10-CM

## 2021-11-01 DIAGNOSIS — I25.2 HX OF NON-ST ELEVATION MYOCARDIAL INFARCTION (NSTEMI): ICD-10-CM

## 2021-11-01 DIAGNOSIS — Z79.01 ANTICOAGULATED: ICD-10-CM

## 2021-11-01 DIAGNOSIS — E78.2 MIXED HYPERLIPIDEMIA: ICD-10-CM

## 2021-11-01 DIAGNOSIS — I27.29 OTHER SECONDARY PULMONARY HYPERTENSION (HCC): ICD-10-CM

## 2021-11-01 DIAGNOSIS — I25.2 H/O NON-ST ELEVATION MYOCARDIAL INFARCTION (NSTEMI): ICD-10-CM

## 2021-11-01 DIAGNOSIS — R07.89 OTHER CHEST PAIN: ICD-10-CM

## 2021-11-01 DIAGNOSIS — Z82.49 FAMILY HISTORY OF CORONARY ARTERY DISEASE IN FATHER: ICD-10-CM

## 2021-11-01 DIAGNOSIS — I34.0 NON-RHEUMATIC MITRAL REGURGITATION: ICD-10-CM

## 2021-11-01 DIAGNOSIS — I31.39 PERICARDIAL EFFUSION: ICD-10-CM

## 2021-11-01 DIAGNOSIS — I42.7 DRUG-INDUCED CARDIOMYOPATHY (HCC): ICD-10-CM

## 2021-11-01 DIAGNOSIS — I50.23 ACUTE ON CHRONIC HFREF (HEART FAILURE WITH REDUCED EJECTION FRACTION) (HCC): ICD-10-CM

## 2021-11-01 DIAGNOSIS — Z95.810 AICD (AUTOMATIC CARDIOVERTER/DEFIBRILLATOR) PRESENT: ICD-10-CM

## 2021-11-01 DIAGNOSIS — R00.2 PALPITATIONS: ICD-10-CM

## 2021-11-01 DIAGNOSIS — I26.99 PULMONARY EMBOLISM, OTHER, UNSPECIFIED CHRONICITY, UNSPECIFIED WHETHER ACUTE COR PULMONALE PRESENT (HCC): ICD-10-CM

## 2021-11-01 DIAGNOSIS — F14.11 HISTORY OF COCAINE ABUSE (HCC): ICD-10-CM

## 2021-11-01 DIAGNOSIS — I36.1 TRICUSPID VALVE REGURGITATION, NONRHEUMATIC: ICD-10-CM

## 2021-11-01 PROCEDURE — 99215 OFFICE O/P EST HI 40 MIN: CPT | Mod: CR | Performed by: INTERNAL MEDICINE

## 2021-11-01 RX ORDER — LOSARTAN POTASSIUM 25 MG/1
12.5 TABLET ORAL DAILY
Qty: 45 TABLET | Refills: 7 | Status: SHIPPED | OUTPATIENT
Start: 2021-11-01

## 2021-11-01 RX ORDER — TORSEMIDE 20 MG/1
80 TABLET ORAL DAILY
Qty: 360 TABLET | Refills: 7 | Status: SHIPPED | OUTPATIENT
Start: 2021-11-01 | End: 2021-12-07

## 2021-11-01 RX ORDER — SPIRONOLACTONE 25 MG/1
25 TABLET ORAL DAILY
Qty: 90 TABLET | Refills: 7 | Status: SHIPPED | OUTPATIENT
Start: 2021-11-01

## 2021-11-01 ASSESSMENT — FIBROSIS 4 INDEX: FIB4 SCORE: 0.87

## 2021-11-01 NOTE — LETTER
"     [facility] [place] [address],   [address]nd [place] [address]  Phone: [phone]  Fax: [phone]              David Rod  1985    Encounter Date: 11/1/2021    Karli Grace M.D.          PROGRESS NOTE:  Subjective:   Chief Complaint:   Chief Complaint   Patient presents with   • Congestive Heart Failure     & Drug-induced cardiomyopathy      This evaluation was conducted via Zoom using secure and encrypted videoconferencing technology. The patient was in a private location in the [facility].    The patient's identity was confirmed and verbal consent was obtained for this virtual visit.    David Rod is a 36 y.o. male who returns for dilated CMO, BiV failure, NSTEMI, prior PE, possible LV thrombus, anticoagulation, HTN, ICD.    My first visit with him early Nov 2020, he was in [facility] 4 CHF.  We changed meds, he lost 40-45 pounds in 2 weeks.  Weight was as high as 260, was previously 220 leaving the hospital, got down to 215, now 220-225 but he thinks healthy weight.  Weight up to 299 in ED, has been to the ED, once they gave him fluids.  Has had N/V also.  Troponin normal.  Noting UGARTE, LE swelling and sleeping in a chair.  Has been over the past few weeks.    Prior LE edema caused wounds, did have wound and needed wound care but now healed up.  Was in a W/C, when I first met him.  Now [facility] 3, only 20 steps now.  Also some mild CP with UGARTE, resolves at rest.  Had been on torsemide and jada and daily metolazone though it was intended PRN.    Has MI in Feb due to drug use, has dilated CMO, EF < 20%, biV failure  NSTEMI was not Type I MI.  Was told \"blood clot in my lung.\" Later coughing up blood.  On warfarin.  No LV thrombus on echo 9-2020 or 3-2021.  EF up to 30-35%.  Has dilated LV.    Has primary prevention ICD 2021, no shocks.    He was feeling dizzy in July, stopped low EF meds.    Passive liver " congestion.    Has HTN, no battery in his cuff at home.    No stroke/TIA like symptoms.    No palpitations.    Has a venous clot removed from arm after some kind of line.   UE 10-4-2020 Occlusive thrombus within the right internal jugular vein, nonocclusive thrombus within the right median cubital vein.    Has HLP, LDL high, TG high, HDL low, no meds yet.  Father's  had HLP.    Father had MI/stents in his 50s,  at 72.  MGF  of chf in hs 80s.  No prior smoking history.  No history of diabetes.  No history of autoimmune disease such as lupus or rheumatoid arthritis.  No chronic kidney disease.  No ETOH overuse. Prior heavy etoh, sober 7 years.  No caffeine overuse. Soda.  No more recreation substance use. Sober from prior cocaine use.    Here with his mother, his support.  Living in [place] at present.    DATA REVIEWED by me:  ECG (my personal interpretation) [id number]  Sinus, 91, LPFB, delayed R wave progression, T wave inversion    ICD check 21  Decreased RV output, no shocks.    IMPLANTED DEVICE INFORMATION:  21 Dr. Conroy  Pulse generator is a Medtronic model [id number]   Serial number [id number]      LEAD INFORMATION:  1. Right ventricular lead is a Medtronic model 6935M, serial number [id number], R wave 8.6 millivolts, threshold 0.75 Volts, pacing impedance 475 Ohms.    Echo 3-22-21  Left ventricle is severely dilated, 7 cm.  Moderately reduced left ventricular systolic function.  Left ventricular ejection fraction is visually estimated to be 30-35%.  Moderate global hypokinesis with abnormal septal motion related to   conduction abnormality.  Mild to moderate mitral regurgitation.  Unable to estimate pulmonary artery pressure, normal estimated right   atrial pressure.     No prior study is available for comparison.     Echo Guevara Hero 2020  Severely dilated LV <20%, RV moderately dilated, severe LAE, mild to mod TR, mod MR, RVSP 70, RAP 20, left pleural effusion, small dimitry  effusion, flat septum.    RUQ US 6-3-2020  Cholelithiasis is present.  The pancreas is not well seen.  The liver appears enlarged.  The portal vein is not reliably imaged.    US UE 10-4-2020  Occlusive thrombus within the right internal jugular vein  Nonocclusive thrombus within the right median cubital vein.    Most recent labs:       Lab Results   Component Value Date/Time    WBC 10.0 10/29/2021 11:00 AM    HEMOGLOBIN 12.8 (L) 10/29/2021 11:00 AM    HEMATOCRIT 39.6 (L) 10/29/2021 11:00 AM    MCV 87.0 10/29/2021 11:00 AM    INR 1.22 10/29/2021 11:00 AM      Lab Results   Component Value Date/Time    SODIUM 133 (L) 10/29/2021 11:00 AM    POTASSIUM 3.1 (L) 10/29/2021 11:00 AM    CHLORIDE 98 10/29/2021 11:00 AM    CO2 23 10/29/2021 11:00 AM    GLUCOSE 131 (H) 10/29/2021 11:00 AM    BUN 17 10/29/2021 11:00 AM    CREATININE 1.4 (H) 10/29/2021 11:00 AM      Lab Results   Component Value Date/Time    ASTSGOT 28 10/29/2021 11:00 AM    ALTSGPT 23 10/29/2021 11:00 AM    ALBUMIN 3.4 10/29/2021 11:00 AM      Lab Results   Component Value Date/Time    CHOLSTRLTOT 248 (H) 12/17/2020 08:55 AM     (H) 12/17/2020 08:55 AM    HDL 33.0 (L) 12/17/2020 08:55 AM    TRIGLYCERIDE 211 (H) 12/17/2020 08:55 AM     No results for input(s): NTPROBNP, TROPONINT in the last 72 hours.      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 Moderate global hypokinesis. Mildly dilated RV. Normal RA and LA. Mild TR. Ascending aorta 3.5cm. May 2020: Hospital for acute CHF. LVEF <15%.   • Ear pain    • GERD (gastroesophageal reflux disease)    • Hypertension    • Insomnia    • Myocardial infarct (HCC) 2020    twice    • Pulmonary embolism and infarction (HCC)     arm and lung, taking warfarin   • Systolic heart failure (HCC)      Past Surgical History:   Procedure Laterality Date   • AICD IMPLANT  Left 04/23/2021    Medtronic Crome VR [id number] implanted by Dr. Conroy.     Family History   Problem Relation Age of Onset   • Heart Disease Father    • Heart Attack Father         Stents in 50s     Social History     Socioeconomic History   • Marital status: Single     Spouse name: Not on file   • Number of children: Not on file   • Years of education: Not on file   • Highest education level: Not on file   Occupational History   • Not on file   Tobacco Use   • Smoking status: Never Smoker   • Smokeless tobacco: Never Used   Vaping Use   • Vaping Use: Never used   Substance and Sexual Activity   • Alcohol use: Not Currently   • Drug use: Not Currently     Comment: Hx of daily cocaine use   • Sexual activity: Not Currently   Other Topics Concern   • Not on file   Social History Narrative   • Not on file     Social Determinants of Health     Financial Resource Strain:    • Difficulty of Paying Living Expenses:    Food Insecurity:    • Worried About Running Out of Food in the Last Year:    • Ran Out of Food in the Last Year:    Transportation Needs:    • Lack of Transportation (Medical):    • Lack of Transportation (Non-Medical):    Physical Activity:    • Days of Exercise per Week:    • Minutes of Exercise per Session:    Stress:    • Feeling of Stress :    Social Connections:    • Frequency of Communication with Friends and Family:    • Frequency of Social Gatherings with Friends and Family:    • Attends [employer] Services:    • Active Member of Clubs or Organizations:    • Attends Club or Organization Meetings:    • Marital Status:    Intimate Partner Violence:    • Fear of Current or Ex-Partner:    • Emotionally Abused:    • Physically Abused:    • Sexually Abused:      No Known Allergies    Current Outpatient Medications   Medication Sig Dispense Refill   • spironolactone (ALDACTONE) 25 MG Tab Take 1 Tablet by mouth every day. 90 Tablet 7   • losartan (COZAAR) 25 MG Tab Take 0.5 Tablets by mouth every day. 45 Tablet  7   • torsemide (DEMADEX) 20 MG Tab Take 4 Tablets by mouth every day. 360 Tablet 7   • ondansetron (ZOFRAN ODT) 4 MG TABLET DISPERSIBLE Take 1 Tablet by mouth every 6 hours as needed for Nausea for up to 10 days. 10 Tablet 0   • promethazine (PHENERGAN) 25 MG Suppos Insert 1 Suppository into the rectum every 6 hours as needed for Nausea/Vomiting. 10 Suppository 0   • warfarin (COUMADIN) 5 MG Tab take 2 to 2 and 1/2 tablet by mouth daily as directed 225 Tablet 0   • acetaminophen (TYLENOL) 500 MG Tab Take 1,000 mg by mouth every 6 hours as needed.       No current facility-administered medications for this visit.       ROS    All others systems reviewed and negative.     Objective:     Ht 1.829 m (6')   Wt (!) 136 kg (299 lb)  Body mass index is 40.55 kg/m².    Vitals obtained by patient:  Reports /80.    Physical Exam:  General: No acute distress. Well nourished.   HEENT: EOM grossly intact, no scleral icterus, no pharyngeal erythema.  Phonation normal.  Neck:  No JVD noted at 90 degrees, trachea midline, no obvious masses, moves freely without pain.  CVS: Pulse as reported by patient, Trace LE edema.  Resp: Unlabored respiratory effort, no cough or audible wheeze  MSK/Ext: No clubbing or cyanosis visible appreciated.  Skin: No rashes in visible areas.  Neurological: CN III-XII grossly intact. No focal deficits.   Psych: A&O x 3, appropriate affect, good judgement, well groomed        Assessment:     1. Acute on chronic HFrEF (heart failure with reduced ejection fraction) ([facility])  Basic Metabolic Panel    Basic Metabolic Panel   2. AICD (automatic cardioverter/defibrillator) present     3. Drug-induced cardiomyopathy (HCC)     4. Anticoagulated     5. Pulmonary embolism, other, unspecified chronicity, unspecified whether acute cor pulmonale present ([facility])     6. Intracardiac thrombus     7. H/O non-ST elevation myocardial infarction (NSTEMI)     8. Other chest pain     9. Family history of coronary artery  disease in father     10. Palpitations     11. Other secondary pulmonary hypertension (HCC)     12. Tricuspid valve regurgitation, nonrheumatic     13. Hx of non-ST elevation myocardial infarction (NSTEMI)     14. Non-rheumatic mitral regurgitation     15. Pericardial effusion     16. Mixed hyperlipidemia     17. History of cocaine abuse (HCC)         Medical Decision Making:  Today's Assessment / Status / Plan:     -Clinically in acute CHF, needs close FU, med adjustments  -Prior Dry weight appears was 220-225, he is up to 300  -Stopped low EF meds due to side effects, needs to resume, see below.  -Atypical CP, normal recent troponin  -Stay on warfarin, sees vascular clinic  -Has strong FH CAD and abnormal cholesterol but still young, will address in 40s. Prior MI related to drug use.  -CP probably related to CHF   -keep an eye on blood pressure make sure does not come tomorrow  -Nausea and vomiting related to CHF most likely, giving fluids is not the right answer  -RTC 2 weeks with sandhya RECINOS MD 2 months      Written instructions given today:      -Increase torsemide 20 mgfrom 2 tablets daily to 4 tablets daily.    -Resume spironolactone 25 mg once daily.    -Losartan 12.5 mg once daily.    -Nonfasting blood work in 5 days, and 15 days.    -We need to keep an eye on your kidney function and potassium.    -You should lose 10 pounds of water weight over the next 2 weeks.    -Virtual visit with a nurse practitioner in 2 weeks.    -Eventually we need to get you back on the carvedilol as well because it is a powerful heart medicine.  If you are having side effects in the future, let us know so we can make medication changes.            Return in about 2 weeks (around 11/15/2021).    It is my pleasure to participate in the care of Mr. Rod.  Please do not hesitate to contact me with questions or concerns.    Karli Grace MD, [facility]  Cardiologist [facility]    Please  note that this dictation was created using voice recognition software. I have made every reasonable attempt to correct obvious errors, but it is possible there are errors of grammar and possibly content that I did not discover before finalizing the note.        LEROY Davidson.  [address]  [place] [address]  Via In Basket

## 2021-11-01 NOTE — PATIENT INSTRUCTIONS
-Increase torsemide 20 mgfrom 2 tablets daily to 4 tablets daily.    -Resume spironolactone 25 mg once daily.    -Losartan 12.5 mg once daily.    -Nonfasting blood work in 5 days, and 15 days.    -We need to keep an eye on your kidney function and potassium.    -You should lose 10 pounds of water weight over the next 2 weeks.    -Virtual visit with a nurse practitioner in 2 weeks.    -Eventually we need to get you back on the carvedilol as well because it is a powerful heart medicine.  If you are having side effects in the future, let us know so we can make medication changes.

## 2021-11-02 ENCOUNTER — ANTICOAGULATION MONITORING (OUTPATIENT)
Dept: VASCULAR LAB | Facility: MEDICAL CENTER | Age: 36
End: 2021-11-02

## 2021-11-02 DIAGNOSIS — I51.3 INTRACARDIAC THROMBUS: ICD-10-CM

## 2021-11-02 NOTE — PROGRESS NOTES
Anticoagulation Summary  As of 2021    INR goal:  2.0-3.0   TTR:  33.0 % (10.5 mo)   INR used for dosin.22 (10/29/2021)   Warfarin maintenance plan:  15 mg (5 mg x 3) every Tue, Thu, Sat; 10 mg (5 mg x 2) all other days   Weekly warfarin total:  85 mg   Plan last modified:  Terry ParkerD (2021)   Next INR check:     Target end date:  Indefinite    Indications    Intracardiac thrombus [I51.3]             Anticoagulation Episode Summary     INR check location:  Anticoagulation Clinic    Preferred lab:      Send INR reminders to:      Comments:        Anticoagulation Care Providers     Provider Role Specialty Phone number    [facility]   [phone]    BENJAMIN Davidson  Family Medicine [phone]        Anticoagulation Patient Findings          Requested pt return call to discuss anticoagulation management.

## 2021-11-03 NOTE — PROGRESS NOTES
Left additional VM message for patient to contact [facility]    Savanah Adams, Clinical Pharmacist, CDE, CACP

## 2021-11-04 NOTE — PROGRESS NOTES
3rd call  Attempted to contact pt regarding subtherapeutic INR  LVM to discuss what dose of warfarin he has been taking  Will attempt again later    Guerda Shetty, TerryD

## 2021-11-09 NOTE — PROGRESS NOTES
4th call  Attempted to contact pt regarding subtherapeutic INR  LVM to discuss what dose of warfarin he has been taking  Also sent MYChart message  Will await pt contact     Terry ParkerD

## 2021-11-12 NOTE — PROGRESS NOTES
Anticoagulation Summary  As of 2021    INR goal:  2.0-3.0   TTR:  33.0 % (10.5 mo)   INR used for dosin.22 (10/29/2021)   Warfarin maintenance plan:  15 mg (5 mg x 3) every Tue, Thu, Sat; 10 mg (5 mg x 2) all other days   Weekly warfarin total:  85 mg   Plan last modified:  Terry ParkerD (2021)   Next INR check:  2021   Target end date:  Indefinite    Indications    Intracardiac thrombus [I51.3]             Anticoagulation Episode Summary     INR check location:  Anticoagulation Clinic    Preferred lab:      Send INR reminders to:      Comments:        Anticoagulation Care Providers     Provider Role Specialty Phone number    [facility]   [phone]    LEROY Davidson.  [facility] [phone]        Anticoagulation Patient Findings          Spoke with David to report a subtherapeutic INR.  Pt reports not feeling well, and was throwing up medication for several days.  Pt has self increased his dose to 15mg.  Strongly recommend he get INR retested in am.  Savanah Adams, Clinical Pharmacist, CDE, CACP

## 2021-11-15 ENCOUNTER — ANTICOAGULATION MONITORING (OUTPATIENT)
Dept: VASCULAR LAB | Facility: MEDICAL CENTER | Age: 36
End: 2021-11-15

## 2021-11-15 DIAGNOSIS — I51.3 INTRACARDIAC THROMBUS: ICD-10-CM

## 2021-11-16 NOTE — PROGRESS NOTES
Anticoagulation Summary  As of 11/15/2021    INR goal:  2.0-3.0   TTR:  33.0 % (11.1 mo)   INR used for dosin.22 (11/15/2021)   Warfarin maintenance plan:  15 mg (5 mg x 3) every Tue, Thu, Sat; 10 mg (5 mg x 2) all other days   Weekly warfarin total:  85 mg   Plan last modified:  Guerda Shetty PharmD (2021)   Next INR check:  2021   Target end date:  Indefinite    Indications    Intracardiac thrombus [I51.3]             Anticoagulation Episode Summary     INR check location:  Anticoagulation Clinic    Preferred lab:      Send INR reminders to:      Comments:        Anticoagulation Care Providers     Provider Role Specialty Phone number    [facility]   [phone]    BENJAMIN Davidson  Family Medicine [phone]          Refer to Anticoagulation Patient Findings for HPI  Patient Findings     Positives:  Extra doses    Negatives:  Signs/symptoms of thrombosis, Signs/symptoms of bleeding, Laboratory test error suspected, Change in health, Change in alcohol use, Change in activity, Upcoming invasive procedure, Emergency department visit, Upcoming dental procedure, Missed doses, Change in medications, Change in diet/appetite, Hospital admission, Bruising, Other complaints          Spoke with pt.  INR is supratherapeutic.     Pt verifies warfarin weekly dosing.     Will have pt hold x 1 day then reduce regimen as listed above    Repeat INR in 2 week(s).     Guerda Shetty, Kendra

## 2021-12-07 RX ORDER — TORSEMIDE 20 MG/1
TABLET ORAL
Qty: 360 TABLET | Refills: 3 | Status: SHIPPED | OUTPATIENT
Start: 2021-12-07 | End: 2021-12-07

## 2021-12-07 RX ORDER — TORSEMIDE 20 MG/1
80 TABLET ORAL DAILY
Qty: 360 TABLET | Refills: 3 | Status: SHIPPED | OUTPATIENT
Start: 2021-12-07

## 2021-12-08 ENCOUNTER — APPOINTMENT (OUTPATIENT)
Dept: CARDIOLOGY | Facility: PHYSICIAN GROUP | Age: 36
End: 2021-12-08
Payer: MEDICAID

## 2021-12-28 PROBLEM — Z79.01 ANTICOAGULATED: Status: RESOLVED | Noted: 2020-11-18 | Resolved: 2021-12-28

## 2021-12-28 PROBLEM — Z53.29 LEFT AGAINST MEDICAL ADVICE: Status: RESOLVED | Noted: 2020-06-27 | Resolved: 2021-12-28

## 2022-02-17 DIAGNOSIS — Z79.01 CHRONIC ANTICOAGULATION: ICD-10-CM

## 2022-03-01 ENCOUNTER — ANTICOAGULATION MONITORING (OUTPATIENT)
Dept: VASCULAR LAB | Facility: MEDICAL CENTER | Age: 37
End: 2022-03-01
Payer: MEDICAID

## 2022-03-01 DIAGNOSIS — I51.3 INTRACARDIAC THROMBUS: ICD-10-CM

## 2022-03-02 RX ORDER — WARFARIN SODIUM 5 MG/1
TABLET ORAL
Qty: 225 TABLET | Refills: 0 | Status: SHIPPED | OUTPATIENT
Start: 2022-03-02

## 2022-03-02 NOTE — PROGRESS NOTES
OP Anticoagulation Service Note    Date: 3/1/2022    Anticoagulation Summary  As of 3/1/2022    INR goal:  2.0-3.0   TTR:  35.1 % (1.2 y)   INR used for dosin.28 (2022)   Warfarin maintenance plan:  15 mg (5 mg x 3) every e, Thu, Sat; 10 mg (5 mg x 2) all other days   Weekly warfarin total:  85 mg   Plan last modified:  Diego Grace, PharmD (3/1/2022)   Next INR check:  3/4/2022   Target end date:  Indefinite    Indications    Intracardiac thrombus [I51.3]             Anticoagulation Episode Summary     INR check location:  Anticoagulation Clinic    Preferred lab:      Send INR reminders to:      Comments:        Anticoagulation Care Providers     Provider Role Specialty Phone number    [facility]   [phone]    BENJAMIN Davidson  [facility] Medicine [phone]        Anticoagulation Patient Findings      Voice message for patient regarding their anticoagulant.  Asked patient to call us back as the result was from last week.    HPI:   The reason for today's call is to prevent morbidity and mortality from a blood clot and/or stroke and to reduce the risk of bleeding while on a anticoagulant.     PCP:  Con Henley, PVaniAVani-C.  [address] [place], [place] [address]  [facility] [address]    Assessment:     • INR  sub-therapeutic, from last week       Current Outpatient Medications:   •  Pseudoeph-Doxylamine-DM-APAP (NYQUIL PO), Take  by mouth.  •  traZODone, Take 1 or 2 tablets at bedtime  •  promethazine-dextromethorphan, Take 1 teaspoonful every 6 hours for cough  •  hydrOXYzine pamoate, Take 1 capsule up to 3 times daily for anxiety  •  promethazine, 25 mg, Oral, Q6HRS PRN  •  torsemide, 80 mg, Oral, DAILY  •  spironolactone, 25 mg, Oral, DAILY  •  losartan, 12.5 mg, Oral, DAILY  •  warfarin, take 2 to 2 and 1/2 tablet by mouth daily as directed  •  acetaminophen, 1,000 mg, Oral, Q6HRS PRN      Plan:     • Continue the same warfarin dose, as noted above.   •     Follow-up:      • Our protocol suggests we test in 3 days         Additional information discussed with patient:     • Asked patient to please call the anticoagulation clinic if they have any signs/symptoms of bleeding and/or thrombosis or any changes to diet or medications.      National recommendations regarding anticoagulation therapy:     The CHEST guidelines recommends frequent INR monitoring at regular intervals (a few days up to a max of 12 weeks) to ensure patients are on the proper dose of warfarin, and patients are not having any complications from therapy.  INRs can dramatically change over a short time period due to diet, medications, and medical conditions.     [facility]  Phone [phone] fax [phone]    This note was created using voice recognition software (Dragon). The accuracy of the dictation is limited by the abilities of the software. I have reviewed the note prior to signing, however some errors in grammar and context are still possible. If you have any questions related to this note please do not hesitate to contact our office.

## 2022-03-16 ENCOUNTER — ANTICOAGULATION MONITORING (OUTPATIENT)
Dept: VASCULAR LAB | Facility: MEDICAL CENTER | Age: 37
End: 2022-03-16
Payer: MEDICAID

## 2022-03-16 DIAGNOSIS — I51.3 INTRACARDIAC THROMBUS: ICD-10-CM

## 2022-03-16 NOTE — LETTER
David Rod  [address] [address]    03/18/22    Dear David Rod ,    We have been unsuccessful in our attempts to contact you regarding your Anticoagulation Service appointments. Wafarin is a potent blood-thinning agent that requires monitoring to ensure that the dosage is correct for your body.  If it isn't, you could develop serious, sometimes life-threatening bleeding problems or life-threatening blood clots or stroke could result.    To monitor you effectively, we need to be able to communicate with you.  This is a requirement to be followed by our Service.       If you repeatedly fail to keep your lab appointments, you are at risk of being discharged from the Anticoagulation Service.    It is extremely important that you contact the clinic as soon as possible to arrange appropriate follow up.  We are open Monday-Friday 8 am until 5 pm.  You may reach our Service at [phone].           Sincerely,           Lonny Wing, PharmD, [facility]  Clinic Supervisor  [facility]  Outpatient Anticoagulation Service

## 2022-03-16 NOTE — PROGRESS NOTES
Called pt regarding critically sub-therapeutic INR - no answer. Prefer to s/w pt. LVM to c/b ASAP.    Milton Crooks, TerryD, BCACP

## 2022-03-17 NOTE — PROGRESS NOTES
Left vm message for patient to return call with his current warfarin dosing (critically subtherapeutic)  Savanah Adams, Clinical Pharmacist, CDE, CACP

## 2022-03-18 NOTE — PROGRESS NOTES
Third request for patient to return call with warfarin dosing.  Left VM message with emergency contacts.    Letter sent  Savanah Adams, Clinical Pharmacist, CDE, CACP

## 2022-03-25 ENCOUNTER — TELEPHONE (OUTPATIENT)
Dept: VASCULAR LAB | Facility: MEDICAL CENTER | Age: 37
End: 2022-03-25
Payer: MEDICAID

## 2022-03-26 PROBLEM — R11.2 INTRACTABLE VOMITING WITH NAUSEA: Status: ACTIVE | Noted: 2022-03-26

## 2022-03-26 PROBLEM — I50.22 CHRONIC HFREF (HEART FAILURE WITH REDUCED EJECTION FRACTION) (HCC): Status: ACTIVE | Noted: 2020-10-19

## 2022-03-26 PROBLEM — E87.6 HYPOKALEMIA: Status: ACTIVE | Noted: 2022-03-26

## 2022-03-26 PROBLEM — Z51.81 SUBTHERAPEUTIC ANTICOAGULATION: Status: ACTIVE | Noted: 2022-03-26

## 2022-03-26 PROBLEM — I50.20 HFREF (HEART FAILURE WITH REDUCED EJECTION FRACTION) (HCC): Status: ACTIVE | Noted: 2020-10-19

## 2022-03-26 PROBLEM — Z79.01 SUBTHERAPEUTIC ANTICOAGULATION: Status: ACTIVE | Noted: 2022-03-26

## 2022-03-26 PROBLEM — R11.2 INTRACTABLE VOMITING WITH NAUSEA: Status: RESOLVED | Noted: 2022-03-26 | Resolved: 2022-03-26

## 2022-07-27 ENCOUNTER — NON-PROVIDER VISIT (OUTPATIENT)
Dept: CARDIOLOGY | Facility: MEDICAL CENTER | Age: 37
End: 2022-07-27
Payer: MEDICAID

## 2022-07-27 PROCEDURE — 93295 DEV INTERROG REMOTE 1/2/MLT: CPT | Performed by: INTERNAL MEDICINE

## 2022-07-28 NOTE — CARDIAC REMOTE MONITOR - SCAN
Device transmission reviewed. Device demonstrated appropriate function.       Electronically Signed by: Brandyn Conroy M.D.    7/28/2022  11:58 AM

## 2022-08-04 ENCOUNTER — TELEPHONE (OUTPATIENT)
Dept: VASCULAR LAB | Facility: MEDICAL CENTER | Age: 37
End: 2022-08-04
Payer: MEDICAID

## 2022-08-04 NOTE — LETTER
David Rod  [address] [address]    08/04/22    Dear David Rod ,    We have been unsuccessful in our attempts to contact you regarding your Anticoagulation Service appointments. Warfarin is a potent blood-thinning agent that requires monitoring to ensure that the dosage is correct for your body.  If it isn't, you could develop serious, sometimes life-threatening bleeding problems or life-threatening blood clots or stroke could result.    To monitor you effectively, we need to be able to communicate with you.  This is a requirement to be followed by our Service.       If you repeatedly fail to keep your lab appointments, you are at risk of being discharged from the Anticoagulation Service.    It is extremely important that you contact the clinic as soon as possible to arrange appropriate follow up.  We are open Monday-Friday 8 am until 5 pm.  You may reach our Service at [phone].           Sincerely,           Lonny Wing, PharmD, [facility]  Clinic Supervisor  [facility]  Outpatient Anticoagulation Service

## 2022-09-02 ENCOUNTER — DOCUMENTATION (OUTPATIENT)
Dept: VASCULAR LAB | Facility: MEDICAL CENTER | Age: 37
End: 2022-09-02
Payer: MEDICAID

## 2022-09-02 NOTE — PROGRESS NOTES
[facility] [place] [facility]      Pt is over due for PT/INR for warfarin monitoring. Left message for patient to have INR checked ASAP.     Clinic phone number left for any questions or concerns.  Sent the patient [employer] message as well.    Halie StewartD

## 2022-09-15 ENCOUNTER — TELEPHONE (OUTPATIENT)
Dept: CARDIOLOGY | Facility: MEDICAL CENTER | Age: 37
End: 2022-09-15

## 2022-09-15 NOTE — TELEPHONE ENCOUNTER
TAYLER    Caller:  - [facility]    Topic/issue: David is in Hospice care, and is requesting that his pace maker defibrillator be disconnected .      Callback Number: [phone]    Thank you,    Kaylyn TOBIAS

## 2022-09-16 ENCOUNTER — DOCUMENTATION (OUTPATIENT)
Dept: VASCULAR LAB | Facility: MEDICAL CENTER | Age: 37
End: 2022-09-16
Payer: MEDICAID

## 2022-09-19 NOTE — TELEPHONE ENCOUNTER
Called [facility] back, asked if pt could come into the office to have therapies turned off, he is unfortunately homebound. Gave [employer] rep [employer]'s phone number to reach out to.

## 2022-10-21 ENCOUNTER — TELEPHONE (OUTPATIENT)
Dept: VASCULAR LAB | Facility: MEDICAL CENTER | Age: 37
End: 2022-10-21
Payer: MEDICAID

## 2022-10-21 NOTE — TELEPHONE ENCOUNTER
Called pt regarding overdue INR - no answer. LVM asking pt to c/b or reschedule ASAP.    Milton Crooks, TerryD, BCACP

## 2022-10-26 ENCOUNTER — NON-PROVIDER VISIT (OUTPATIENT)
Dept: CARDIOLOGY | Facility: MEDICAL CENTER | Age: 37
End: 2022-10-26
Payer: MEDICAID

## 2022-10-26 PROCEDURE — 93295 DEV INTERROG REMOTE 1/2/MLT: CPT | Performed by: INTERNAL MEDICINE

## 2022-10-26 NOTE — CARDIAC REMOTE MONITOR - SCAN
Device transmission reviewed. Device demonstrated appropriate function.       Electronically Signed by: Donald Garcia M.D.    10/27/2022  8:14 AM